# Patient Record
Sex: FEMALE | Race: WHITE | NOT HISPANIC OR LATINO | Employment: OTHER
[De-identification: names, ages, dates, MRNs, and addresses within clinical notes are randomized per-mention and may not be internally consistent; named-entity substitution may affect disease eponyms.]

---

## 2020-08-10 LAB
CYTOLOGY CVX/VAG DOC THIN PREP: NORMAL
HPV16+18+45 E6+E7MRNA CVX NAA+PROBE: NEGATIVE

## 2021-08-09 ENCOUNTER — TELEPHONE (OUTPATIENT)
Dept: SCHEDULING | Age: 46
End: 2021-08-09

## 2021-08-26 ENCOUNTER — OFFICE VISIT (OUTPATIENT)
Dept: FAMILY MEDICINE | Age: 46
End: 2021-08-26

## 2021-08-26 VITALS
BODY MASS INDEX: 27.49 KG/M2 | WEIGHT: 161 LBS | DIASTOLIC BLOOD PRESSURE: 61 MMHG | HEIGHT: 64 IN | RESPIRATION RATE: 16 BRPM | OXYGEN SATURATION: 98 % | TEMPERATURE: 98.9 F | SYSTOLIC BLOOD PRESSURE: 96 MMHG | HEART RATE: 80 BPM

## 2021-08-26 DIAGNOSIS — E55.9 VITAMIN D DEFICIENCY: ICD-10-CM

## 2021-08-26 DIAGNOSIS — E78.5 HYPERLIPIDEMIA, UNSPECIFIED HYPERLIPIDEMIA TYPE: ICD-10-CM

## 2021-08-26 DIAGNOSIS — R30.0 DYSURIA: Primary | ICD-10-CM

## 2021-08-26 DIAGNOSIS — K80.20 CALCULUS OF GALLBLADDER WITHOUT CHOLECYSTITIS WITHOUT OBSTRUCTION: ICD-10-CM

## 2021-08-26 DIAGNOSIS — K21.9 GASTROESOPHAGEAL REFLUX DISEASE WITHOUT ESOPHAGITIS: ICD-10-CM

## 2021-08-26 LAB
APPEARANCE, POC: CLEAR
BILIRUBIN, POC: NEGATIVE
COLOR, POC: YELLOW
GLUCOSE UR-MCNC: NEGATIVE MG/DL
KETONES, POC: ABNORMAL MG/DL
NITRITE, POC: NEGATIVE
OCCULT BLOOD, POC: ABNORMAL
PH UR: 5.5 [PH] (ref 5–7)
PROT UR-MCNC: NEGATIVE MG/DL
SP GR UR: >= 1.03 (ref 1–1.03)
UROBILINOGEN UR-MCNC: 0.2 MG/DL (ref 0–1)
WBC (LEUKOCYTE) ESTERASE, POC: NEGATIVE

## 2021-08-26 PROCEDURE — 99204 OFFICE O/P NEW MOD 45 MIN: CPT | Performed by: FAMILY MEDICINE

## 2021-08-26 PROCEDURE — 81003 URINALYSIS AUTO W/O SCOPE: CPT | Performed by: FAMILY MEDICINE

## 2021-08-26 PROCEDURE — 87086 URINE CULTURE/COLONY COUNT: CPT | Performed by: FAMILY MEDICINE

## 2021-08-26 RX ORDER — NITROFURANTOIN 25; 75 MG/1; MG/1
100 CAPSULE ORAL 2 TIMES DAILY
COMMUNITY
Start: 2021-08-23 | End: 2021-09-27 | Stop reason: ALTCHOICE

## 2021-08-26 RX ORDER — CEPHALEXIN 250 MG/1
250 CAPSULE ORAL 3 TIMES DAILY
Qty: 15 CAPSULE | Refills: 0 | Status: SHIPPED | OUTPATIENT
Start: 2021-08-26 | End: 2021-09-27 | Stop reason: ALTCHOICE

## 2021-08-26 ASSESSMENT — ENCOUNTER SYMPTOMS
RESPIRATORY NEGATIVE: 1
ENDOCRINE NEGATIVE: 1
CONSTITUTIONAL NEGATIVE: 1
PSYCHIATRIC NEGATIVE: 1
ALLERGIC/IMMUNOLOGIC NEGATIVE: 1
HEMATOLOGIC/LYMPHATIC NEGATIVE: 1
ABDOMINAL DISTENTION: 1
NEUROLOGICAL NEGATIVE: 1
EYES NEGATIVE: 1

## 2021-08-26 ASSESSMENT — PATIENT HEALTH QUESTIONNAIRE - PHQ9
1. LITTLE INTEREST OR PLEASURE IN DOING THINGS: NOT AT ALL
2. FEELING DOWN, DEPRESSED OR HOPELESS: NOT AT ALL
CLINICAL INTERPRETATION OF PHQ9 SCORE: NO FURTHER SCREENING NEEDED
SUM OF ALL RESPONSES TO PHQ9 QUESTIONS 1 AND 2: 0
CLINICAL INTERPRETATION OF PHQ2 SCORE: NO FURTHER SCREENING NEEDED
SUM OF ALL RESPONSES TO PHQ9 QUESTIONS 1 AND 2: 0

## 2021-08-28 LAB — BACTERIA UR CULT: NORMAL

## 2021-08-30 ENCOUNTER — TELEPHONE (OUTPATIENT)
Dept: FAMILY MEDICINE | Age: 46
End: 2021-08-30

## 2021-09-03 ENCOUNTER — LAB SERVICES (OUTPATIENT)
Dept: LAB | Age: 46
End: 2021-09-03

## 2021-09-03 DIAGNOSIS — K21.9 GASTROESOPHAGEAL REFLUX DISEASE WITHOUT ESOPHAGITIS: ICD-10-CM

## 2021-09-03 DIAGNOSIS — E78.5 HYPERLIPIDEMIA, UNSPECIFIED HYPERLIPIDEMIA TYPE: ICD-10-CM

## 2021-09-03 DIAGNOSIS — E55.9 VITAMIN D DEFICIENCY: ICD-10-CM

## 2021-09-03 DIAGNOSIS — R30.0 DYSURIA: ICD-10-CM

## 2021-09-03 LAB
25(OH)D3+25(OH)D2 SERPL-MCNC: 28.1 NG/ML (ref 30–100)
ALBUMIN SERPL-MCNC: 3.7 G/DL (ref 3.6–5.1)
ALBUMIN/GLOB SERPL: 1 {RATIO} (ref 1–2.4)
ALP SERPL-CCNC: 62 UNITS/L (ref 45–117)
ALT SERPL-CCNC: 24 UNITS/L
ANION GAP SERPL CALC-SCNC: 12 MMOL/L (ref 10–20)
AST SERPL-CCNC: 14 UNITS/L
BASOPHILS # BLD: 0 K/MCL (ref 0–0.3)
BASOPHILS NFR BLD: 1 %
BILIRUB SERPL-MCNC: 0.4 MG/DL (ref 0.2–1)
BUN SERPL-MCNC: 10 MG/DL (ref 6–20)
BUN/CREAT SERPL: 13 (ref 7–25)
CALCIUM SERPL-MCNC: 8.7 MG/DL (ref 8.4–10.2)
CHLORIDE SERPL-SCNC: 107 MMOL/L (ref 98–107)
CO2 SERPL-SCNC: 26 MMOL/L (ref 21–32)
CREAT SERPL-MCNC: 0.79 MG/DL (ref 0.51–0.95)
DEPRECATED RDW RBC: 40.5 FL (ref 39–50)
EOSINOPHIL # BLD: 0.2 K/MCL (ref 0–0.5)
EOSINOPHIL NFR BLD: 4 %
ERYTHROCYTE [DISTWIDTH] IN BLOOD: 12.4 % (ref 11–15)
FASTING DURATION TIME PATIENT: 14 HOURS (ref 0–999)
GFR SERPLBLD BASED ON 1.73 SQ M-ARVRAT: >90 ML/MIN
GLOBULIN SER-MCNC: 3.7 G/DL (ref 2–4)
GLUCOSE SERPL-MCNC: 82 MG/DL (ref 65–99)
HCT VFR BLD CALC: 38.7 % (ref 36–46.5)
HGB BLD-MCNC: 12.8 G/DL (ref 12–15.5)
IMM GRANULOCYTES # BLD AUTO: 0 K/MCL (ref 0–0.2)
IMM GRANULOCYTES # BLD: 0 %
LYMPHOCYTES # BLD: 2.2 K/MCL (ref 1–4.8)
LYMPHOCYTES NFR BLD: 43 %
MCH RBC QN AUTO: 29.3 PG (ref 26–34)
MCHC RBC AUTO-ENTMCNC: 33.1 G/DL (ref 32–36.5)
MCV RBC AUTO: 88.6 FL (ref 78–100)
MONOCYTES # BLD: 0.3 K/MCL (ref 0.3–0.9)
MONOCYTES NFR BLD: 7 %
NEUTROPHILS # BLD: 2.4 K/MCL (ref 1.8–7.7)
NEUTROPHILS NFR BLD: 45 %
NRBC BLD MANUAL-RTO: 0 /100 WBC
PLATELET # BLD AUTO: 221 K/MCL (ref 140–450)
POTASSIUM SERPL-SCNC: 3.9 MMOL/L (ref 3.4–5.1)
PROT SERPL-MCNC: 7.4 G/DL (ref 6.4–8.2)
RBC # BLD: 4.37 MIL/MCL (ref 4–5.2)
SODIUM SERPL-SCNC: 141 MMOL/L (ref 135–145)
TSH SERPL-ACNC: 2.02 MCUNITS/ML (ref 0.35–5)
WBC # BLD: 5.2 K/MCL (ref 4.2–11)

## 2021-09-03 PROCEDURE — 82306 VITAMIN D 25 HYDROXY: CPT | Performed by: FAMILY MEDICINE

## 2021-09-03 PROCEDURE — 85025 COMPLETE CBC W/AUTO DIFF WBC: CPT | Performed by: FAMILY MEDICINE

## 2021-09-03 PROCEDURE — 84443 ASSAY THYROID STIM HORMONE: CPT | Performed by: FAMILY MEDICINE

## 2021-09-03 PROCEDURE — 80053 COMPREHEN METABOLIC PANEL: CPT | Performed by: FAMILY MEDICINE

## 2021-09-03 PROCEDURE — 36415 COLL VENOUS BLD VENIPUNCTURE: CPT | Performed by: FAMILY MEDICINE

## 2021-09-27 ENCOUNTER — TELEPHONE (OUTPATIENT)
Dept: SCHEDULING | Age: 46
End: 2021-09-27

## 2021-09-27 ENCOUNTER — OFFICE VISIT (OUTPATIENT)
Dept: FAMILY MEDICINE | Age: 46
End: 2021-09-27

## 2021-09-27 VITALS
HEART RATE: 78 BPM | WEIGHT: 161 LBS | SYSTOLIC BLOOD PRESSURE: 110 MMHG | BODY MASS INDEX: 27.49 KG/M2 | TEMPERATURE: 98.6 F | DIASTOLIC BLOOD PRESSURE: 70 MMHG | HEIGHT: 64 IN | OXYGEN SATURATION: 99 % | RESPIRATION RATE: 16 BRPM

## 2021-09-27 DIAGNOSIS — N89.8 VAGINAL DISCHARGE: ICD-10-CM

## 2021-09-27 DIAGNOSIS — R30.0 DYSURIA: Primary | ICD-10-CM

## 2021-09-27 LAB
APPEARANCE, POC: CLEAR
BILIRUBIN, POC: NEGATIVE
COLOR, POC: YELLOW
GLUCOSE UR-MCNC: NEGATIVE MG/DL
KETONES, POC: NEGATIVE MG/DL
NITRITE, POC: NEGATIVE
OCCULT BLOOD, POC: NEGATIVE
PH UR: 7 [PH] (ref 5–7)
PROT UR-MCNC: NEGATIVE MG/DL
SP GR UR: 1.02 (ref 1–1.03)
UROBILINOGEN UR-MCNC: 0.2 MG/DL (ref 0–1)
WBC (LEUKOCYTE) ESTERASE, POC: NEGATIVE

## 2021-09-27 PROCEDURE — 87086 URINE CULTURE/COLONY COUNT: CPT | Performed by: NURSE PRACTITIONER

## 2021-09-27 PROCEDURE — 99213 OFFICE O/P EST LOW 20 MIN: CPT | Performed by: NURSE PRACTITIONER

## 2021-09-27 PROCEDURE — 87070 CULTURE OTHR SPECIMN AEROBIC: CPT | Performed by: NURSE PRACTITIONER

## 2021-09-27 PROCEDURE — 81003 URINALYSIS AUTO W/O SCOPE: CPT | Performed by: NURSE PRACTITIONER

## 2021-09-27 PROCEDURE — 87205 SMEAR GRAM STAIN: CPT | Performed by: NURSE PRACTITIONER

## 2021-09-27 RX ORDER — CEFUROXIME AXETIL 250 MG/1
TABLET ORAL
Qty: 14 TABLET | Refills: 0 | Status: SHIPPED | OUTPATIENT
Start: 2021-09-27

## 2021-09-27 ASSESSMENT — PATIENT HEALTH QUESTIONNAIRE - PHQ9
SUM OF ALL RESPONSES TO PHQ9 QUESTIONS 1 AND 2: 0
SUM OF ALL RESPONSES TO PHQ9 QUESTIONS 1 AND 2: 0
CLINICAL INTERPRETATION OF PHQ9 SCORE: NO FURTHER SCREENING NEEDED
2. FEELING DOWN, DEPRESSED OR HOPELESS: NOT AT ALL
1. LITTLE INTEREST OR PLEASURE IN DOING THINGS: NOT AT ALL
CLINICAL INTERPRETATION OF PHQ2 SCORE: NO FURTHER SCREENING NEEDED

## 2021-09-27 ASSESSMENT — ENCOUNTER SYMPTOMS
PSYCHIATRIC NEGATIVE: 1
FEVER: 0
CHILLS: 0

## 2021-09-28 LAB
BACTERIAL VAGINOSIS VAG-IMP: ABNORMAL
WBC SPEC QL GRAM STN: ABNORMAL
YEAST SPEC QL GRAM STN: ABNORMAL

## 2021-09-29 LAB — BACTERIA UR CULT: NORMAL

## 2021-10-01 LAB — BACTERIA GENITAL AEROBE CULT: NORMAL

## 2021-11-16 ENCOUNTER — LAB REQUISITION (OUTPATIENT)
Dept: LAB | Age: 46
End: 2021-11-16

## 2021-11-16 DIAGNOSIS — Z12.4 ENCOUNTER FOR SCREENING FOR MALIGNANT NEOPLASM OF CERVIX: ICD-10-CM

## 2021-11-16 DIAGNOSIS — R10.2 PELVIC PAIN: Primary | ICD-10-CM

## 2021-11-16 PROCEDURE — 88175 CYTOPATH C/V AUTO FLUID REDO: CPT | Performed by: CLINICAL MEDICAL LABORATORY

## 2021-11-16 PROCEDURE — 87624 HPV HI-RISK TYP POOLED RSLT: CPT | Performed by: CLINICAL MEDICAL LABORATORY

## 2021-11-22 LAB
CASE RPRT: NORMAL
CLINICAL INFO: NORMAL
CYTOLOGY CVX/VAG STUDY: NORMAL
HPV16+18+45 E6+E7MRNA CVX NAA+PROBE: NEGATIVE
Lab: NORMAL
PAP EDUCATIONAL NOTE: NORMAL
SPECIMEN ADEQUACY: NORMAL

## 2022-11-21 ENCOUNTER — TELEPHONE (OUTPATIENT)
Dept: SCHEDULING | Age: 47
End: 2022-11-21

## 2022-11-22 ENCOUNTER — WALK IN (OUTPATIENT)
Dept: URGENT CARE | Age: 47
End: 2022-11-22
Attending: EMERGENCY MEDICINE

## 2022-11-22 ENCOUNTER — HOSPITAL ENCOUNTER (OUTPATIENT)
Dept: GENERAL RADIOLOGY | Age: 47
Discharge: HOME | End: 2022-11-22
Attending: EMERGENCY MEDICINE

## 2022-11-22 VITALS
WEIGHT: 165 LBS | TEMPERATURE: 98.4 F | RESPIRATION RATE: 16 BRPM | SYSTOLIC BLOOD PRESSURE: 122 MMHG | BODY MASS INDEX: 28.32 KG/M2 | DIASTOLIC BLOOD PRESSURE: 76 MMHG | OXYGEN SATURATION: 98 % | HEART RATE: 82 BPM

## 2022-11-22 DIAGNOSIS — M79.604 PAIN OF RIGHT LOWER EXTREMITY: ICD-10-CM

## 2022-11-22 DIAGNOSIS — M25.551 RIGHT HIP PAIN: ICD-10-CM

## 2022-11-22 DIAGNOSIS — M25.551 RIGHT HIP PAIN: Primary | ICD-10-CM

## 2022-11-22 PROCEDURE — 73502 X-RAY EXAM HIP UNI 2-3 VIEWS: CPT

## 2022-11-22 PROCEDURE — 99202 OFFICE O/P NEW SF 15 MIN: CPT

## 2022-11-22 PROCEDURE — 73590 X-RAY EXAM OF LOWER LEG: CPT

## 2022-11-22 RX ORDER — KETOROLAC TROMETHAMINE 10 MG/1
10 TABLET, FILM COATED ORAL EVERY 6 HOURS PRN
Qty: 15 TABLET | Refills: 0 | Status: SHIPPED | OUTPATIENT
Start: 2022-11-22 | End: 2022-11-27

## 2022-11-22 ASSESSMENT — ENCOUNTER SYMPTOMS
ABDOMINAL PAIN: 0
BRUISES/BLEEDS EASILY: 0
VOMITING: 0
DIZZINESS: 0
SORE THROAT: 0
HEADACHES: 0
CHILLS: 0
SHORTNESS OF BREATH: 0
FEVER: 0
COUGH: 0
BACK PAIN: 0
DIARRHEA: 0

## 2022-11-22 ASSESSMENT — PAIN SCALES - GENERAL: PAINLEVEL: 3

## 2023-06-06 PROBLEM — R10.11 RUQ PAIN: Status: ACTIVE | Noted: 2023-06-06

## 2023-06-06 PROBLEM — K80.20 CALCULUS OF GALLBLADDER WITHOUT CHOLECYSTITIS WITHOUT OBSTRUCTION: Status: ACTIVE | Noted: 2023-06-06

## 2023-06-06 PROBLEM — R10.13 EPIGASTRIC PAIN: Status: ACTIVE | Noted: 2023-06-06

## 2023-06-06 PROBLEM — R68.81 EARLY SATIETY: Status: ACTIVE | Noted: 2023-06-06

## 2023-08-19 ENCOUNTER — ANESTHESIA EVENT (OUTPATIENT)
Dept: SURGERY | Facility: HOSPITAL | Age: 48
End: 2023-08-19
Payer: COMMERCIAL

## 2023-08-19 ENCOUNTER — HOSPITAL ENCOUNTER (OUTPATIENT)
Facility: HOSPITAL | Age: 48
Setting detail: OBSERVATION
Discharge: HOME | End: 2023-08-19
Attending: EMERGENCY MEDICINE | Admitting: SURGERY
Payer: COMMERCIAL

## 2023-08-19 ENCOUNTER — APPOINTMENT (OUTPATIENT)
Dept: GENERAL RADIOLOGY | Facility: HOSPITAL | Age: 48
End: 2023-08-19
Attending: EMERGENCY MEDICINE
Payer: COMMERCIAL

## 2023-08-19 ENCOUNTER — APPOINTMENT (OUTPATIENT)
Dept: GENERAL RADIOLOGY | Facility: HOSPITAL | Age: 48
End: 2023-08-19
Attending: SURGERY
Payer: COMMERCIAL

## 2023-08-19 ENCOUNTER — ANESTHESIA (OUTPATIENT)
Dept: SURGERY | Facility: HOSPITAL | Age: 48
End: 2023-08-19
Payer: COMMERCIAL

## 2023-08-19 ENCOUNTER — APPOINTMENT (OUTPATIENT)
Dept: CT IMAGING | Facility: HOSPITAL | Age: 48
End: 2023-08-19
Attending: EMERGENCY MEDICINE
Payer: COMMERCIAL

## 2023-08-19 VITALS
DIASTOLIC BLOOD PRESSURE: 68 MMHG | TEMPERATURE: 98 F | RESPIRATION RATE: 16 BRPM | SYSTOLIC BLOOD PRESSURE: 120 MMHG | BODY MASS INDEX: 29.02 KG/M2 | HEART RATE: 77 BPM | WEIGHT: 170 LBS | OXYGEN SATURATION: 95 % | HEIGHT: 64 IN

## 2023-08-19 DIAGNOSIS — K81.0 ACUTE CHOLECYSTITIS: Primary | ICD-10-CM

## 2023-08-19 PROBLEM — K80.00 CHOLELITHIASIS AND ACUTE CHOLECYSTITIS WITHOUT OBSTRUCTION: Status: ACTIVE | Noted: 2023-08-19

## 2023-08-19 LAB
ALBUMIN SERPL-MCNC: 3.8 G/DL (ref 3.4–5)
ALBUMIN/GLOB SERPL: 1 {RATIO} (ref 1–2)
ALP LIVER SERPL-CCNC: 68 U/L
ALT SERPL-CCNC: 22 U/L
ANION GAP SERPL CALC-SCNC: 5 MMOL/L (ref 0–18)
AST SERPL-CCNC: 12 U/L (ref 15–37)
ATRIAL RATE: 68 BPM
B-HCG UR QL: NEGATIVE
BASOPHILS # BLD AUTO: 0.02 X10(3) UL (ref 0–0.2)
BASOPHILS NFR BLD AUTO: 0.3 %
BILIRUB SERPL-MCNC: 0.4 MG/DL (ref 0.1–2)
BILIRUB UR QL STRIP.AUTO: NEGATIVE
BUN BLD-MCNC: 8 MG/DL (ref 7–18)
CALCIUM BLD-MCNC: 8.8 MG/DL (ref 8.5–10.1)
CHLORIDE SERPL-SCNC: 104 MMOL/L (ref 98–112)
CLARITY UR REFRACT.AUTO: CLEAR
CO2 SERPL-SCNC: 27 MMOL/L (ref 21–32)
CREAT BLD-MCNC: 0.79 MG/DL
EGFRCR SERPLBLD CKD-EPI 2021: 93 ML/MIN/1.73M2 (ref 60–?)
EOSINOPHIL # BLD AUTO: 0.04 X10(3) UL (ref 0–0.7)
EOSINOPHIL NFR BLD AUTO: 0.5 %
ERYTHROCYTE [DISTWIDTH] IN BLOOD BY AUTOMATED COUNT: 12.6 %
GLOBULIN PLAS-MCNC: 3.9 G/DL (ref 2.8–4.4)
GLUCOSE BLD-MCNC: 104 MG/DL (ref 70–99)
GLUCOSE UR STRIP.AUTO-MCNC: NORMAL MG/DL
HCT VFR BLD AUTO: 37.7 %
HGB BLD-MCNC: 13.1 G/DL
IMM GRANULOCYTES # BLD AUTO: 0.01 X10(3) UL (ref 0–1)
IMM GRANULOCYTES NFR BLD: 0.1 %
KETONES UR STRIP.AUTO-MCNC: 10 MG/DL
LEUKOCYTE ESTERASE UR QL STRIP.AUTO: 250
LIPASE SERPL-CCNC: 31 U/L (ref 13–75)
LYMPHOCYTES # BLD AUTO: 2.31 X10(3) UL (ref 1–4)
LYMPHOCYTES NFR BLD AUTO: 29.7 %
MCH RBC QN AUTO: 28.9 PG (ref 26–34)
MCHC RBC AUTO-ENTMCNC: 34.7 G/DL (ref 31–37)
MCV RBC AUTO: 83 FL
MONOCYTES # BLD AUTO: 0.44 X10(3) UL (ref 0.1–1)
MONOCYTES NFR BLD AUTO: 5.7 %
NEUTROPHILS # BLD AUTO: 4.96 X10 (3) UL (ref 1.5–7.7)
NEUTROPHILS # BLD AUTO: 4.96 X10(3) UL (ref 1.5–7.7)
NEUTROPHILS NFR BLD AUTO: 63.7 %
NITRITE UR QL STRIP.AUTO: NEGATIVE
OSMOLALITY SERPL CALC.SUM OF ELEC: 281 MOSM/KG (ref 275–295)
P AXIS: 19 DEGREES
P-R INTERVAL: 180 MS
PH UR STRIP.AUTO: 5.5 [PH] (ref 5–8)
PLATELET # BLD AUTO: 259 10(3)UL (ref 150–450)
POTASSIUM SERPL-SCNC: 3.4 MMOL/L (ref 3.5–5.1)
PROT SERPL-MCNC: 7.7 G/DL (ref 6.4–8.2)
PROT UR STRIP.AUTO-MCNC: NEGATIVE MG/DL
Q-T INTERVAL: 412 MS
QRS DURATION: 72 MS
QTC CALCULATION (BEZET): 438 MS
R AXIS: 42 DEGREES
RBC # BLD AUTO: 4.54 X10(6)UL
SODIUM SERPL-SCNC: 136 MMOL/L (ref 136–145)
SP GR UR STRIP.AUTO: 1.01 (ref 1–1.03)
T AXIS: 33 DEGREES
UROBILINOGEN UR STRIP.AUTO-MCNC: NORMAL MG/DL
VENTRICULAR RATE: 68 BPM
WBC # BLD AUTO: 7.8 X10(3) UL (ref 4–11)

## 2023-08-19 PROCEDURE — 74177 CT ABD & PELVIS W/CONTRAST: CPT | Performed by: EMERGENCY MEDICINE

## 2023-08-19 PROCEDURE — 3074F SYST BP LT 130 MM HG: CPT | Performed by: SURGERY

## 2023-08-19 PROCEDURE — 74300 X-RAY BILE DUCTS/PANCREAS: CPT | Performed by: SURGERY

## 2023-08-19 PROCEDURE — BF502Z0 OTHER IMAGING OF BILE DUCTS USING FLUORESCING AGENT, INTRAOPERATIVE: ICD-10-PCS | Performed by: SURGERY

## 2023-08-19 PROCEDURE — 0FT44ZZ RESECTION OF GALLBLADDER, PERCUTANEOUS ENDOSCOPIC APPROACH: ICD-10-PCS | Performed by: SURGERY

## 2023-08-19 PROCEDURE — 99202 OFFICE O/P NEW SF 15 MIN: CPT | Performed by: SURGERY

## 2023-08-19 PROCEDURE — 71045 X-RAY EXAM CHEST 1 VIEW: CPT | Performed by: EMERGENCY MEDICINE

## 2023-08-19 PROCEDURE — 3078F DIAST BP <80 MM HG: CPT | Performed by: SURGERY

## 2023-08-19 PROCEDURE — 3008F BODY MASS INDEX DOCD: CPT | Performed by: SURGERY

## 2023-08-19 RX ORDER — OXYCODONE HYDROCHLORIDE 10 MG/1
10 TABLET ORAL EVERY 4 HOURS PRN
Status: DISCONTINUED | OUTPATIENT
Start: 2023-08-19 | End: 2023-08-20

## 2023-08-19 RX ORDER — CEFOXITIN 2 G/1
INJECTION, POWDER, FOR SOLUTION INTRAVENOUS AS NEEDED
Status: DISCONTINUED | OUTPATIENT
Start: 2023-08-19 | End: 2023-08-19 | Stop reason: SURG

## 2023-08-19 RX ORDER — ACETAMINOPHEN 500 MG
1000 TABLET ORAL ONCE AS NEEDED
Status: DISCONTINUED | OUTPATIENT
Start: 2023-08-19 | End: 2023-08-19 | Stop reason: HOSPADM

## 2023-08-19 RX ORDER — ROCURONIUM BROMIDE 10 MG/ML
INJECTION, SOLUTION INTRAVENOUS AS NEEDED
Status: DISCONTINUED | OUTPATIENT
Start: 2023-08-19 | End: 2023-08-19 | Stop reason: SURG

## 2023-08-19 RX ORDER — HYDROMORPHONE HYDROCHLORIDE 1 MG/ML
0.4 INJECTION, SOLUTION INTRAMUSCULAR; INTRAVENOUS; SUBCUTANEOUS EVERY 2 HOUR PRN
Status: DISCONTINUED | OUTPATIENT
Start: 2023-08-19 | End: 2023-08-20

## 2023-08-19 RX ORDER — MEPERIDINE HYDROCHLORIDE 25 MG/ML
INJECTION INTRAMUSCULAR; INTRAVENOUS; SUBCUTANEOUS
Status: COMPLETED
Start: 2023-08-19 | End: 2023-08-19

## 2023-08-19 RX ORDER — ACETAMINOPHEN 500 MG
1000 TABLET ORAL EVERY 8 HOURS SCHEDULED
Status: DISCONTINUED | OUTPATIENT
Start: 2023-08-19 | End: 2023-08-20

## 2023-08-19 RX ORDER — HYDROMORPHONE HYDROCHLORIDE 1 MG/ML
0.2 INJECTION, SOLUTION INTRAMUSCULAR; INTRAVENOUS; SUBCUTANEOUS EVERY 5 MIN PRN
Status: DISCONTINUED | OUTPATIENT
Start: 2023-08-19 | End: 2023-08-19 | Stop reason: HOSPADM

## 2023-08-19 RX ORDER — HYDROCODONE BITARTRATE AND ACETAMINOPHEN 5; 325 MG/1; MG/1
2 TABLET ORAL ONCE AS NEEDED
Status: DISCONTINUED | OUTPATIENT
Start: 2023-08-19 | End: 2023-08-19 | Stop reason: HOSPADM

## 2023-08-19 RX ORDER — HYDROMORPHONE HYDROCHLORIDE 1 MG/ML
0.8 INJECTION, SOLUTION INTRAMUSCULAR; INTRAVENOUS; SUBCUTANEOUS EVERY 2 HOUR PRN
Status: DISCONTINUED | OUTPATIENT
Start: 2023-08-19 | End: 2023-08-20

## 2023-08-19 RX ORDER — MIDAZOLAM HYDROCHLORIDE 1 MG/ML
1 INJECTION INTRAMUSCULAR; INTRAVENOUS EVERY 5 MIN PRN
Status: DISCONTINUED | OUTPATIENT
Start: 2023-08-19 | End: 2023-08-19 | Stop reason: HOSPADM

## 2023-08-19 RX ORDER — LIDOCAINE HYDROCHLORIDE 10 MG/ML
INJECTION, SOLUTION EPIDURAL; INFILTRATION; INTRACAUDAL; PERINEURAL AS NEEDED
Status: DISCONTINUED | OUTPATIENT
Start: 2023-08-19 | End: 2023-08-19 | Stop reason: SURG

## 2023-08-19 RX ORDER — DIPHENHYDRAMINE HYDROCHLORIDE 50 MG/ML
12.5 INJECTION INTRAMUSCULAR; INTRAVENOUS AS NEEDED
Status: DISCONTINUED | OUTPATIENT
Start: 2023-08-19 | End: 2023-08-19 | Stop reason: HOSPADM

## 2023-08-19 RX ORDER — BUPIVACAINE HYDROCHLORIDE AND EPINEPHRINE 5; 5 MG/ML; UG/ML
INJECTION, SOLUTION EPIDURAL; INTRACAUDAL; PERINEURAL AS NEEDED
Status: DISCONTINUED | OUTPATIENT
Start: 2023-08-19 | End: 2023-08-19 | Stop reason: HOSPADM

## 2023-08-19 RX ORDER — ENEMA 19; 7 G/133ML; G/133ML
1 ENEMA RECTAL ONCE AS NEEDED
Status: DISCONTINUED | OUTPATIENT
Start: 2023-08-19 | End: 2023-08-20

## 2023-08-19 RX ORDER — DEXAMETHASONE SODIUM PHOSPHATE 4 MG/ML
VIAL (ML) INJECTION AS NEEDED
Status: DISCONTINUED | OUTPATIENT
Start: 2023-08-19 | End: 2023-08-19 | Stop reason: SURG

## 2023-08-19 RX ORDER — MEPERIDINE HYDROCHLORIDE 25 MG/ML
12.5 INJECTION INTRAMUSCULAR; INTRAVENOUS; SUBCUTANEOUS AS NEEDED
Status: COMPLETED | OUTPATIENT
Start: 2023-08-19 | End: 2023-08-19

## 2023-08-19 RX ORDER — PROCHLORPERAZINE EDISYLATE 5 MG/ML
5 INJECTION INTRAMUSCULAR; INTRAVENOUS EVERY 8 HOURS PRN
Status: DISCONTINUED | OUTPATIENT
Start: 2023-08-19 | End: 2023-08-20

## 2023-08-19 RX ORDER — FAMOTIDINE 20 MG/1
20 TABLET, FILM COATED ORAL DAILY
Status: DISCONTINUED | OUTPATIENT
Start: 2023-08-19 | End: 2023-08-20

## 2023-08-19 RX ORDER — HYDROMORPHONE HYDROCHLORIDE 1 MG/ML
0.4 INJECTION, SOLUTION INTRAMUSCULAR; INTRAVENOUS; SUBCUTANEOUS EVERY 5 MIN PRN
Status: DISCONTINUED | OUTPATIENT
Start: 2023-08-19 | End: 2023-08-19 | Stop reason: HOSPADM

## 2023-08-19 RX ORDER — SODIUM CHLORIDE, SODIUM LACTATE, POTASSIUM CHLORIDE, CALCIUM CHLORIDE 600; 310; 30; 20 MG/100ML; MG/100ML; MG/100ML; MG/100ML
INJECTION, SOLUTION INTRAVENOUS CONTINUOUS
Status: DISCONTINUED | OUTPATIENT
Start: 2023-08-19 | End: 2023-08-19 | Stop reason: HOSPADM

## 2023-08-19 RX ORDER — HYDROCODONE BITARTRATE AND ACETAMINOPHEN 5; 325 MG/1; MG/1
1 TABLET ORAL EVERY 6 HOURS PRN
Qty: 20 TABLET | Refills: 0 | Status: SHIPPED | OUTPATIENT
Start: 2023-08-19

## 2023-08-19 RX ORDER — ONDANSETRON 2 MG/ML
4 INJECTION INTRAMUSCULAR; INTRAVENOUS EVERY 6 HOURS PRN
Status: DISCONTINUED | OUTPATIENT
Start: 2023-08-19 | End: 2023-08-19 | Stop reason: HOSPADM

## 2023-08-19 RX ORDER — KETOROLAC TROMETHAMINE 30 MG/ML
INJECTION, SOLUTION INTRAMUSCULAR; INTRAVENOUS AS NEEDED
Status: DISCONTINUED | OUTPATIENT
Start: 2023-08-19 | End: 2023-08-19 | Stop reason: SURG

## 2023-08-19 RX ORDER — ONDANSETRON 2 MG/ML
4 INJECTION INTRAMUSCULAR; INTRAVENOUS EVERY 4 HOURS PRN
Status: DISCONTINUED | OUTPATIENT
Start: 2023-08-19 | End: 2023-08-19

## 2023-08-19 RX ORDER — HYDROCODONE BITARTRATE AND ACETAMINOPHEN 5; 325 MG/1; MG/1
1 TABLET ORAL ONCE AS NEEDED
Status: DISCONTINUED | OUTPATIENT
Start: 2023-08-19 | End: 2023-08-19 | Stop reason: HOSPADM

## 2023-08-19 RX ORDER — ONDANSETRON 2 MG/ML
4 INJECTION INTRAMUSCULAR; INTRAVENOUS EVERY 6 HOURS PRN
Status: DISCONTINUED | OUTPATIENT
Start: 2023-08-19 | End: 2023-08-20

## 2023-08-19 RX ORDER — BISACODYL 10 MG
10 SUPPOSITORY, RECTAL RECTAL
Status: DISCONTINUED | OUTPATIENT
Start: 2023-08-19 | End: 2023-08-20

## 2023-08-19 RX ORDER — MIDAZOLAM HYDROCHLORIDE 1 MG/ML
INJECTION INTRAMUSCULAR; INTRAVENOUS
Status: COMPLETED
Start: 2023-08-19 | End: 2023-08-19

## 2023-08-19 RX ORDER — HYDROMORPHONE HYDROCHLORIDE 1 MG/ML
0.5 INJECTION, SOLUTION INTRAMUSCULAR; INTRAVENOUS; SUBCUTANEOUS EVERY 30 MIN PRN
Status: DISCONTINUED | OUTPATIENT
Start: 2023-08-19 | End: 2023-08-19

## 2023-08-19 RX ORDER — SENNOSIDES 8.6 MG
17.2 TABLET ORAL NIGHTLY PRN
Status: DISCONTINUED | OUTPATIENT
Start: 2023-08-19 | End: 2023-08-20

## 2023-08-19 RX ORDER — SODIUM CHLORIDE, SODIUM LACTATE, POTASSIUM CHLORIDE, CALCIUM CHLORIDE 600; 310; 30; 20 MG/100ML; MG/100ML; MG/100ML; MG/100ML
INJECTION, SOLUTION INTRAVENOUS CONTINUOUS
Status: DISCONTINUED | OUTPATIENT
Start: 2023-08-19 | End: 2023-08-20

## 2023-08-19 RX ORDER — HYDROMORPHONE HYDROCHLORIDE 1 MG/ML
INJECTION, SOLUTION INTRAMUSCULAR; INTRAVENOUS; SUBCUTANEOUS
Status: COMPLETED
Start: 2023-08-19 | End: 2023-08-19

## 2023-08-19 RX ORDER — DIPHENHYDRAMINE HYDROCHLORIDE 50 MG/ML
INJECTION INTRAMUSCULAR; INTRAVENOUS AS NEEDED
Status: DISCONTINUED | OUTPATIENT
Start: 2023-08-19 | End: 2023-08-19 | Stop reason: SURG

## 2023-08-19 RX ORDER — ENOXAPARIN SODIUM 100 MG/ML
40 INJECTION SUBCUTANEOUS DAILY
Status: DISCONTINUED | OUTPATIENT
Start: 2023-08-20 | End: 2023-08-20

## 2023-08-19 RX ORDER — KETOROLAC TROMETHAMINE 15 MG/ML
15 INJECTION, SOLUTION INTRAMUSCULAR; INTRAVENOUS ONCE
Status: COMPLETED | OUTPATIENT
Start: 2023-08-19 | End: 2023-08-19

## 2023-08-19 RX ORDER — FAMOTIDINE 10 MG/ML
20 INJECTION, SOLUTION INTRAVENOUS DAILY
Status: DISCONTINUED | OUTPATIENT
Start: 2023-08-19 | End: 2023-08-20

## 2023-08-19 RX ORDER — OXYCODONE HYDROCHLORIDE 5 MG/1
5 TABLET ORAL EVERY 4 HOURS PRN
Status: DISCONTINUED | OUTPATIENT
Start: 2023-08-19 | End: 2023-08-20

## 2023-08-19 RX ORDER — PROCHLORPERAZINE EDISYLATE 5 MG/ML
5 INJECTION INTRAMUSCULAR; INTRAVENOUS EVERY 8 HOURS PRN
Status: DISCONTINUED | OUTPATIENT
Start: 2023-08-19 | End: 2023-08-19 | Stop reason: HOSPADM

## 2023-08-19 RX ORDER — HYDROMORPHONE HYDROCHLORIDE 1 MG/ML
0.6 INJECTION, SOLUTION INTRAMUSCULAR; INTRAVENOUS; SUBCUTANEOUS EVERY 5 MIN PRN
Status: DISCONTINUED | OUTPATIENT
Start: 2023-08-19 | End: 2023-08-19 | Stop reason: HOSPADM

## 2023-08-19 RX ORDER — SODIUM CHLORIDE, SODIUM LACTATE, POTASSIUM CHLORIDE, CALCIUM CHLORIDE 600; 310; 30; 20 MG/100ML; MG/100ML; MG/100ML; MG/100ML
INJECTION, SOLUTION INTRAVENOUS CONTINUOUS PRN
Status: DISCONTINUED | OUTPATIENT
Start: 2023-08-19 | End: 2023-08-19 | Stop reason: SURG

## 2023-08-19 RX ORDER — SODIUM CHLORIDE 9 MG/ML
INJECTION, SOLUTION INTRAVENOUS CONTINUOUS
Status: DISCONTINUED | OUTPATIENT
Start: 2023-08-19 | End: 2023-08-19

## 2023-08-19 RX ORDER — POLYETHYLENE GLYCOL 3350 17 G/17G
17 POWDER, FOR SOLUTION ORAL DAILY PRN
Status: DISCONTINUED | OUTPATIENT
Start: 2023-08-19 | End: 2023-08-20

## 2023-08-19 RX ORDER — NALOXONE HYDROCHLORIDE 0.4 MG/ML
80 INJECTION, SOLUTION INTRAMUSCULAR; INTRAVENOUS; SUBCUTANEOUS AS NEEDED
Status: DISCONTINUED | OUTPATIENT
Start: 2023-08-19 | End: 2023-08-19 | Stop reason: HOSPADM

## 2023-08-19 RX ORDER — ONDANSETRON 2 MG/ML
INJECTION INTRAMUSCULAR; INTRAVENOUS AS NEEDED
Status: DISCONTINUED | OUTPATIENT
Start: 2023-08-19 | End: 2023-08-19 | Stop reason: SURG

## 2023-08-19 RX ADMIN — KETOROLAC TROMETHAMINE 30 MG: 30 INJECTION, SOLUTION INTRAMUSCULAR; INTRAVENOUS at 15:59:00

## 2023-08-19 RX ADMIN — SODIUM CHLORIDE, SODIUM LACTATE, POTASSIUM CHLORIDE, CALCIUM CHLORIDE: 600; 310; 30; 20 INJECTION, SOLUTION INTRAVENOUS at 14:54:00

## 2023-08-19 RX ADMIN — CEFOXITIN 2 G: 2 INJECTION, POWDER, FOR SOLUTION INTRAVENOUS at 15:07:00

## 2023-08-19 RX ADMIN — DEXAMETHASONE SODIUM PHOSPHATE 4 MG: 4 MG/ML VIAL (ML) INJECTION at 15:00:00

## 2023-08-19 RX ADMIN — ONDANSETRON 4 MG: 2 INJECTION INTRAMUSCULAR; INTRAVENOUS at 15:59:00

## 2023-08-19 RX ADMIN — DIPHENHYDRAMINE HYDROCHLORIDE 12.5 MG: 50 INJECTION INTRAMUSCULAR; INTRAVENOUS at 15:00:00

## 2023-08-19 RX ADMIN — LIDOCAINE HYDROCHLORIDE 50 MG: 10 INJECTION, SOLUTION EPIDURAL; INFILTRATION; INTRACAUDAL; PERINEURAL at 15:00:00

## 2023-08-19 RX ADMIN — ROCURONIUM BROMIDE 50 MG: 10 INJECTION, SOLUTION INTRAVENOUS at 15:09:00

## 2023-08-19 NOTE — OPERATIVE REPORT
[facility]  Op Note    Arcadio Brar Location: OR   [facility] [id number] MRN [id number]   Admission Date 8/19/2023 Operation Date 8/19/2023   Attending Physician Edgardo Sorto MD Operating Physician Yisel Murrell MD   DATE OF OPERATION:  8/19/2023   PREOPERATIVE DIAGNOSIS: Acute cholecystitis with cholelithiasis  POSTOPERATIVE DIAGNOSIS: Acute cholecystitis with cholelithiasis  PROCEDURE PERFORMED: Laparoscopic cholecystectomy with intraoperative cholangiogram.   SURGEON:  Yisel Murrell MD  ASSISTANT: Orquidea Shukla PA-C (Her assistance was essential to the performance and conduct of this case, especially in the performance of the cholangiogram and the careful dissection needed in and around the triangle of Calot and gallbladder hilum.)  ANESTHESIA: General.   SPECIMEN: Gallbladder to pathology. BLOOD LOSS:  20 cc   COMPLICATIONS: None. INDICATIONS FOR PROCEDURE: The patient is a 61-year-old female who presented to the emergency room with severe epigastric and right upper quadrant abdominal pain. Work-up revealed acute cholecystitis with cholelithiasis. She was offered urgent laparoscopic cholecystectomy with intraoperative cholangiogram.  The risks, benefits, and alternatives were discussed in detail with the patient. Risks include but are not limited to bleeding, wound infection, right shoulder pain, injury to common bile duct, injury to the liver and injury to other intraabdominal contents. The patient was agreeable to proceed with the operation. FINDINGS: The gallbladder was quite distended and inflamed. Cholangiography was normal.  OPERATIVE TECHNIQUE: After informed consent was obtained, the patient was taken to the operating room and placed in the supine position. General anesthesia was induced. The abdomen was then prepped and draped in the usual sterile fashion. The umbilicus was inverted, and a curvilinear incision was made superior to it with an 11-blade scalpel.   The Veress needle was passed into the abdomen, and pneumoperitoneum was instituted without difficulty. The 11 mm trocar was passed through this incision site. The abdomen was inspected and found to be grossly normal. Under direct vision, a 5 mm subxiphoid port and two right-sided lateral 5 mm ports were placed. The patient was placed head-up, right side up. The gallbladder was grasped and retracted cranially. Dissection commenced around the cystic duct and cystic artery, which were isolated. Dissection was kept above the line of Rouviere. The critical view was obtained, demonstrating 2 structures directly entering the gallbladder. One clip was placed towards the gallbladder on the cystic duct, and the duct was partially transected. The cystic duct was cannulated. A cholangiogram was obtained showing good flow in the duodenum with no filling defects and good flow towards the liver. The cholangiocath was withdrawn, and three clips were placed opposite the partial transection. The duct was then completely transected. The cystic artery was isolated, one clip was placed towards the gallbladder, two away, and the artery was transected. The gallbladder was then dissected free from the liver bed using cautery. The gallbladder was then withdrawn through the umbilical port site after being placed in an Endo Catch bag. Inspection of the liver bed revealed some bleeding that was controlled with cautery. The abdomen was copiously irrigated with normal saline. Once hemostasis was assured and the irrigant returned as clear and colorless, the fascia at the umbilical port site was reapproximated with the Endo Close system and Maxon suture. The pneumoperitoneum was aspirated, and the remaining ports were withdrawn. The skin at all four incisions was reapproximated with subcuticular Vicryl suture. 0.5% Marcaine with epinephrine was injected around the incisions to help with postoperative pain control.  Steri-Strips and Mastisol were placed across the incisions. The patient tolerated the procedure well, was extubated in the OR, and went to the PACU in good condition.     Meche Martinez MD

## 2023-08-19 NOTE — PLAN OF CARE
Patient admitted via stretcher from ED. Oriented to room. Safety precautions initiated. Call light in reach. Spouse Brady at bedside. Reports 1/10 right side abdominal pain. Patient and spouse updated on plan for surgery this afternoon and NPO status.

## 2023-08-19 NOTE — ED INITIAL ASSESSMENT (HPI)
Patient here with c/o RUQ abdominal pain x 4 days. Pain radiates to the back. Patient also nauseated and feels bloated.

## 2023-08-19 NOTE — ED QUICK NOTES
Pt awake and alert, skin w/d,resps reg/unlabored. Pt appears comfortable. Family at bedside. Pt reports pain at 2/10. Pt aware awaiting bed assignment. Pt given additional warm blanket for comfort. Fluids running per order.

## 2023-08-19 NOTE — ANESTHESIA POSTPROCEDURE EVALUATION
[facility] Patient Status:  Observation   Age/Gender 57year old female MRN [id number]   Location [address] [facility] Attending Raven Calvillo MD   [facility] Day # 0 PCP None Pcp       Anesthesia Post-op Note    LAPAROSCOPIC CHOLECYSTECTOMY WITH CHOLANGIOGRAM    Procedure Summary       Date: 08/19/23 Room / Location: [facility] MAIN OR 08 / [address] [facility]    Anesthesia Start: 5338 Anesthesia Stop:     Procedure: LAPAROSCOPIC CHOLECYSTECTOMY WITH CHOLANGIOGRAM (Abdomen) Diagnosis:       Acute cholecystitis      (Acute cholecystitis [K81.0])    Surgeons: Raven Calvillo MD Anesthesiologist: Hang Mccormick MD    Anesthesia Type: general ASA Status: 1            Anesthesia Type: general    Vitals Value Taken Time   /81 08/19/23 1622   Temp 97.8 08/19/23 1622   Pulse 74 08/19/23 1622   Resp 16 08/19/23 1622   SpO2 97 08/19/23 1622       Patient Location: PACU    Anesthesia Type: general    Airway Patency: extubated    Postop Pain Control: adequate    Mental Status: mildly sedated but able to meaningfully participate in the post-anesthesia evaluation    Nausea/Vomiting: none    Cardiopulmonary/Hydration status: stable euvolemic    Complications: no apparent anesthesia related complications    Postop vital signs: stable    Dental Exam: Unchanged from Preop    Patient to be discharged from PACU when criteria met.

## 2023-08-19 NOTE — ANESTHESIA PROCEDURE NOTES
Airway  Date/Time: 8/19/2023 3:04 PM  Urgency: elective      General Information and Staff    Patient location during procedure: OR  Anesthesiologist: Halie Forde MD  Performed: anesthesiologist   Performed by: Halie Forde MD  Authorized by: Halie Forde MD      Indications and Patient Condition  Indications for airway management: anesthesia  Sedation level: deep  Preoxygenated: yes  Patient position: sniffing  Mask difficulty assessment: 1 - vent by mask    Final Airway Details  Final airway type: endotracheal airway      Successful airway: ETT  Cuffed: yes   Successful intubation technique: direct laryngoscopy  Endotracheal tube insertion site: oral  Blade: Kami  Blade size: #3  ETT size (mm): 7.0    Cormack-Lehane Classification: grade I - full view of glottis  Placement verified by: capnometry   Measured from: lips  ETT to lips (cm): 22  Number of attempts at approach: 1

## 2023-08-19 NOTE — PROGRESS NOTES
Pt sent to OR, accompanied by her spouse, Estrellita Lisa. Jewelry removed and left with spouse. Surgical consent signed and on chart.

## 2023-08-19 NOTE — ED QUICK NOTES
Orders for admission, patient is aware of plan and ready to go upstairs. Any questions, please call ED RN Livier at extension [phone]. Patient Covid vaccination status: Fully vaccinated     COVID Test Ordered in ED: None    COVID Suspicion at Admission: N/A    Running Infusions:  NS liter bolus    Mental Status/LOC at time of transport: alert and oriented x 3    Other pertinent information: pt with abdominal pain, + juan, given dilaudid with some relief of pain.    CIWA score: N/A   NIH score:  N/A

## 2023-08-20 NOTE — PROGRESS NOTES
NURSING DISCHARGE NOTE    Discharged Home via  private car . Accompanied by Spouse  Belongings Taken by patient/family. Patient stable, provided handout education, paper prescription, and supplies for patient. Answered questions by patient and patient and spouse verbalized understanding. Discharged w/ spouse and accompanied by PCT.

## 2023-08-29 ENCOUNTER — OFFICE VISIT (OUTPATIENT)
Facility: LOCATION | Age: 48
End: 2023-08-29
Payer: COMMERCIAL

## 2023-08-29 VITALS — TEMPERATURE: 97 F | HEART RATE: 101 BPM

## 2023-08-29 DIAGNOSIS — M79.662 PAIN OF LEFT CALF: Primary | ICD-10-CM

## 2023-08-29 DIAGNOSIS — K80.00 CHOLELITHIASIS AND ACUTE CHOLECYSTITIS WITHOUT OBSTRUCTION: ICD-10-CM

## 2023-08-29 DIAGNOSIS — Z98.890 POST-OPERATIVE STATE: ICD-10-CM

## 2023-08-29 PROCEDURE — 99024 POSTOP FOLLOW-UP VISIT: CPT | Performed by: PHYSICIAN ASSISTANT

## 2023-08-30 ENCOUNTER — TELEPHONE (OUTPATIENT)
Facility: LOCATION | Age: 48
End: 2023-08-30

## 2023-08-30 ENCOUNTER — HOSPITAL ENCOUNTER (OUTPATIENT)
Dept: ULTRASOUND IMAGING | Facility: HOSPITAL | Age: 48
Discharge: HOME | End: 2023-08-30
Attending: PHYSICIAN ASSISTANT
Payer: COMMERCIAL

## 2023-08-30 DIAGNOSIS — M79.662 PAIN OF LEFT CALF: ICD-10-CM

## 2023-08-30 DIAGNOSIS — Z98.890 POST-OPERATIVE STATE: ICD-10-CM

## 2023-08-30 PROCEDURE — 93971 EXTREMITY STUDY: CPT | Performed by: PHYSICIAN ASSISTANT

## 2023-09-26 ENCOUNTER — TELEPHONE (OUTPATIENT)
Facility: LOCATION | Age: 48
End: 2023-09-26

## 2023-09-26 NOTE — TELEPHONE ENCOUNTER
S/w pt. She states over the weekend abd pain around umbilicu became sharp and stabbing with movement, took advil with little relief. Denies n/v/d, constipation. She states the pain in her calf is better but the abd pain is the same.       Appt made for tomorrow with PA to assess

## 2023-09-26 NOTE — TELEPHONE ENCOUNTER
Patient had  lap juan on sx 8/19, last week she had stomach pain. Pain got worse, but improved by Sunday night. Couldn't stand up, take breath, couldn't sleep. Level 5/6, took advil. If put hand vertically on rt side of belly button, pain increases. Also issue w/rt leg cramp.  sent for ultrasound. Leg cramp will let up and goes away.     P: [phone]

## 2023-09-27 ENCOUNTER — LAB ENCOUNTER (OUTPATIENT)
Dept: LAB | Facility: HOSPITAL | Age: 48
End: 2023-09-27
Payer: COMMERCIAL

## 2023-09-27 ENCOUNTER — OFFICE VISIT (OUTPATIENT)
Facility: LOCATION | Age: 48
End: 2023-09-27

## 2023-09-27 VITALS — HEART RATE: 106 BPM | TEMPERATURE: 99 F

## 2023-09-27 DIAGNOSIS — R53.83 OTHER FATIGUE: ICD-10-CM

## 2023-09-27 DIAGNOSIS — R10.13 EPIGASTRIC PAIN: ICD-10-CM

## 2023-09-27 DIAGNOSIS — Z98.890 POST-OPERATIVE STATE: Primary | ICD-10-CM

## 2023-09-27 DIAGNOSIS — K80.00 CHOLELITHIASIS AND ACUTE CHOLECYSTITIS WITHOUT OBSTRUCTION: ICD-10-CM

## 2023-09-27 LAB
BASOPHILS # BLD AUTO: 0.02 X10(3) UL (ref 0–0.2)
BASOPHILS NFR BLD AUTO: 0.4 %
EOSINOPHIL # BLD AUTO: 0.12 X10(3) UL (ref 0–0.7)
EOSINOPHIL NFR BLD AUTO: 2.1 %
ERYTHROCYTE [DISTWIDTH] IN BLOOD BY AUTOMATED COUNT: 13.2 %
HCT VFR BLD AUTO: 35.8 %
HGB BLD-MCNC: 11.9 G/DL
IMM GRANULOCYTES # BLD AUTO: 0.01 X10(3) UL (ref 0–1)
IMM GRANULOCYTES NFR BLD: 0.2 %
LYMPHOCYTES # BLD AUTO: 2.48 X10(3) UL (ref 1–4)
LYMPHOCYTES NFR BLD AUTO: 44 %
MCH RBC QN AUTO: 28.6 PG (ref 26–34)
MCHC RBC AUTO-ENTMCNC: 33.2 G/DL (ref 31–37)
MCV RBC AUTO: 86.1 FL
MONOCYTES # BLD AUTO: 0.58 X10(3) UL (ref 0.1–1)
MONOCYTES NFR BLD AUTO: 10.3 %
NEUTROPHILS # BLD AUTO: 2.42 X10 (3) UL (ref 1.5–7.7)
NEUTROPHILS # BLD AUTO: 2.42 X10(3) UL (ref 1.5–7.7)
NEUTROPHILS NFR BLD AUTO: 43 %
PLATELET # BLD AUTO: 224 10(3)UL (ref 150–450)
RBC # BLD AUTO: 4.16 X10(6)UL
WBC # BLD AUTO: 5.6 X10(3) UL (ref 4–11)

## 2023-09-27 PROCEDURE — 85025 COMPLETE CBC W/AUTO DIFF WBC: CPT

## 2023-09-27 PROCEDURE — 99024 POSTOP FOLLOW-UP VISIT: CPT

## 2023-09-27 PROCEDURE — 36415 COLL VENOUS BLD VENIPUNCTURE: CPT

## 2023-09-27 NOTE — PROGRESS NOTES
Post Operative Visit Note       Active Problems  1. Post-operative state    2. Cholelithiasis and acute cholecystitis without obstruction         Chief Complaint   Patient presents with:  Post-Op:   PO - 8/19 lap juan w/ ESH c/o continues to have sharp stabbing abd pain             History of Present Illness   The patient presents for continued care and evaluation following a laparoscopic cholecystectomy with Dr. Marcha Mortimer on August 19, 2023. The patient initially saw Omi [facility] on August 29, 2023. She was having left calf pain at that time. An ultrasound was ordered and was negative for a DVT. The patient states that since her operation, her abdominal pain has never entirely resolved. She typically has pain slightly to the right of the umbilicus. She states the pain is constant but waxes and wanes in intensity. The pain has worsened over the course of the last week. She states she has been unable to do her regular daily activities due to the pain. The pain is worse with activity. She states she has the most difficulty sleeping at night. She is typically a side sleeper, but unable to do so due to her abdominal pain. She states the pain is typically a cramping pain, but when it gets worse with activity, it is a stabbing pain. She does not have associated nausea or vomiting. She denies associated fevers or chills. She is unable to identify an event that occurred last week prior to her abdominal pain worsening. She denies an association with food and her abdominal pain. Her pain is associated with significant fatigue. Additionally, the patient continues to have intermittent left calf cramping. She denies erythema or edema of the left calf. She states it begins to cramp, then she has to stand but is unable to walk until the pain resolves. She has not been taking anything for the pain. She is tolerating a general diet and having regular bowel movements.           Allergies  Sofia Heading has No Known Allergies. Past Medical / Surgical / Social / Family History    The past medical and past surgical history have been reviewed by me today. Past Medical History:   Diagnosis Date    Abdominal pain     Bloating     Constipation     Diarrhea, unspecified     Flatulence/gas pain/belching     Food intolerance     Heartburn     Indigestion     Irregular bowel habits     Nausea     Wears glasses      History reviewed. No pertinent surgical history. The family history and social history have been reviewed by me today. History reviewed. No pertinent family history. Social History    Socioeconomic History      Marital status:     Tobacco Use      Smoking status: Never      Smokeless tobacco: Never    Substance and Sexual Activity      Alcohol use: Yes      Drug use: Never       Current Outpatient Medications:     HYDROcodone-acetaminophen (NORCO) 5-325 MG Oral Tab, Take 1 tablet by mouth every 6 (six) hours as needed for Pain., Disp: 20 tablet, Rfl: 0    cholecalciferol 1.25 MG (86896 UT) Oral Cap, Take by mouth As Directed., Disp: , Rfl:     intrauterine copper contraceptive Intrauterine IUD, 1 Intra Uterine Device by Intrauterine route one time. , Disp: , Rfl:     Ferrous Sulfate 325 (65 Fe) MG Oral Tab, 1 tablet (325 mg total). , Disp: , Rfl:     Omeprazole 40 MG Oral Capsule Delayed Release, Take 1 capsule (40 mg total) by mouth daily. , Disp: 90 capsule, Rfl: 3      Review of Systems  The Review of Systems has been reviewed by me during today. Review of Systems    Physical Findings   LMP 08/09/2023   Physical Exam  Nursing note reviewed. Constitutional:       Appearance: Normal appearance. She is obese. HENT:      Head: Normocephalic and atraumatic. Right Ear: External ear normal.      Left Ear: External ear normal.      Nose: Nose normal.   Eyes:      Conjunctiva/sclera: Conjunctivae normal.   Abdominal:      General: Abdomen is flat. There is no distension.       Palpations: Abdomen is soft. There is no mass. Tenderness: There is abdominal tenderness in the right upper quadrant and epigastric area. Hernia: No hernia is present. Comments: Clinical exam of the patient's abdomen reveals it to be soft, nondistended without tympany to percussion. She has mild tenderness to palpation in the right abdomen. One of her incision sites had a small amount of suture poking out, so I took the opportunity at today's visit to remove this. The remainder of her incisions are clean, dry, intact without surrounding erythema or cellulitis. Neurological:      Mental Status: She is alert. Psychiatric:         Mood and Affect: Mood normal.         Behavior: Behavior normal.             Assessment   Post-operative state  (primary encounter diagnosis)  Cholelithiasis and acute cholecystitis without obstruction      Plan     I had a prolonged conversation with the patient regarding her symptoms. I expressed the importance of maintaining adequate caloric intake and hydration. I will also order a HIDA scan. I explained that it is unlikely for her to have a bile leak, but I would like to further evaluate her symptoms. I will also order a CBC to ensure she is not anemic. I would like the patient to follow-up with a gastroenterologist.  I recommend she see someone with [facility] to further evaluate her abdominal symptoms. The patient may take ibuprofen and Tylenol as needed for pain management. I also recommend she apply a hot pack over the abdomen to see if this helps alleviate some of her symptoms. We will have the patient schedule follow-up with Dr. Lv Oates in approximately 2 weeks to discuss her imaging. The patient expressed understanding with the above plan. All questions and concerns were addressed. No orders of the defined types were placed in this encounter. Imaging & Referrals   None    Follow Up  No follow-ups on file.     Claudeen Burow, PA-C

## 2023-10-03 ENCOUNTER — HOSPITAL ENCOUNTER (OUTPATIENT)
Dept: NUCLEAR MEDICINE | Facility: HOSPITAL | Age: 48
Discharge: HOME | End: 2023-10-03
Payer: COMMERCIAL

## 2023-10-03 ENCOUNTER — PATIENT MESSAGE (OUTPATIENT)
Facility: LOCATION | Age: 48
End: 2023-10-03

## 2023-10-03 DIAGNOSIS — R10.13 EPIGASTRIC PAIN: ICD-10-CM

## 2023-10-03 PROCEDURE — 78226 HEPATOBILIARY SYSTEM IMAGING: CPT

## 2023-10-03 NOTE — TELEPHONE ENCOUNTER
From: Leilani Dimas  To: Cnidi Jarrett  Sent: 10/3/2023 3:32 PM CDT  Subject: Question regarding NM HEPATOBILIARY SCAN (CPT=78226)    Hello,   The pain that I have a big issue with started after surgery. I didn't have this pain before. I still cannot sleep on my stomach, on the left side. Cannot turn in bed to the left. Cannot get up from bed normally, need to hold on. As I said the pain is close to the belly button and goes around sometimes too. When I press on belly button it is painful. That is one of the cuts. Also my stomach still bigger than it used to be. I became bigger after surgery and still big. I understand that are by goldbladder is healing nicely and that's wonderful. The pain still from surgery by belly button has to be fixed asap. I cannot do anything normally. What other test I can do to figure out what is wrong? I need the pain to go away!

## 2023-10-04 ENCOUNTER — OFFICE VISIT (OUTPATIENT)
Dept: SURGERY | Facility: CLINIC | Age: 48
End: 2023-10-04

## 2023-10-04 VITALS — HEART RATE: 91 BPM | TEMPERATURE: 98 F

## 2023-10-04 DIAGNOSIS — R10.11 RUQ PAIN: Primary | ICD-10-CM

## 2023-10-04 DIAGNOSIS — Z98.890 POST-OPERATIVE STATE: ICD-10-CM

## 2023-10-04 PROBLEM — R10.13 EPIGASTRIC PAIN: Status: RESOLVED | Noted: 2023-06-06 | Resolved: 2023-10-04

## 2023-10-04 PROCEDURE — 99024 POSTOP FOLLOW-UP VISIT: CPT | Performed by: SURGERY

## 2023-10-04 RX ORDER — IBUPROFEN 800 MG/1
800 TABLET ORAL EVERY 8 HOURS
Qty: 21 TABLET | Refills: 0 | Status: SHIPPED | OUTPATIENT
Start: 2023-10-04 | End: 2023-10-11

## 2023-12-04 PROBLEM — R10.9 STOMACH DISCOMFORT: Status: ACTIVE | Noted: 2023-12-04

## 2023-12-04 PROBLEM — R14.1 ABDOMINAL GAS PAIN: Status: ACTIVE | Noted: 2023-12-04

## 2023-12-04 PROBLEM — R12 HEARTBURN: Status: ACTIVE | Noted: 2023-12-04

## 2023-12-18 ENCOUNTER — TELEPHONE (OUTPATIENT)
Facility: LOCATION | Age: 48
End: 2023-12-18

## 2023-12-18 NOTE — TELEPHONE ENCOUNTER
Good afternoon,  The patient recently called stating she is experiencing, cramps, pain, heaviness, numbness in the patients legs. The patient states it is hard to walk as well.     Call back # [phone]

## 2023-12-18 NOTE — TELEPHONE ENCOUNTER
S/w patient to let her know that per Dr Marcha Mortimer she should follow up with her PCP. Verbalized understanding.

## 2024-05-18 ENCOUNTER — WALK IN (OUTPATIENT)
Dept: URGENT CARE | Age: 49
End: 2024-05-18
Attending: EMERGENCY MEDICINE

## 2024-05-18 VITALS
TEMPERATURE: 98.6 F | BODY MASS INDEX: 30.9 KG/M2 | DIASTOLIC BLOOD PRESSURE: 82 MMHG | WEIGHT: 180 LBS | SYSTOLIC BLOOD PRESSURE: 127 MMHG | HEART RATE: 76 BPM | RESPIRATION RATE: 16 BRPM | OXYGEN SATURATION: 99 %

## 2024-05-18 DIAGNOSIS — H66.001 RIGHT ACUTE SUPPURATIVE OTITIS MEDIA: Primary | ICD-10-CM

## 2024-05-18 DIAGNOSIS — J06.9 VIRAL URI WITH COUGH: ICD-10-CM

## 2024-05-18 RX ORDER — AMOXICILLIN 875 MG/1
875 TABLET, COATED ORAL 2 TIMES DAILY
Qty: 20 TABLET | Refills: 0 | Status: SHIPPED | OUTPATIENT
Start: 2024-05-18 | End: 2024-05-28

## 2024-05-18 RX ORDER — DEXTROMETHORPHAN HYDROBROMIDE AND PROMETHAZINE HYDROCHLORIDE 15; 6.25 MG/5ML; MG/5ML
5 SYRUP ORAL 4 TIMES DAILY PRN
Qty: 120 ML | Refills: 1 | Status: SHIPPED | OUTPATIENT
Start: 2024-05-18

## 2024-05-18 ASSESSMENT — PAIN SCALES - GENERAL: PAINLEVEL: 0

## 2024-05-21 ENCOUNTER — TELEPHONE (OUTPATIENT)
Dept: URGENT CARE | Age: 49
End: 2024-05-21

## 2024-08-28 PROBLEM — M54.50 CHRONIC LOWER BACK PAIN: Status: ACTIVE | Noted: 2024-08-28

## 2024-08-28 PROBLEM — G89.29 CHRONIC LOWER BACK PAIN: Status: ACTIVE | Noted: 2024-08-28

## 2024-10-29 ENCOUNTER — LAB SERVICES (OUTPATIENT)
Age: 49
End: 2024-10-29

## 2024-10-29 DIAGNOSIS — R10.2 PELVIC PRESSURE IN FEMALE: ICD-10-CM

## 2024-10-29 PROCEDURE — 81003 URINALYSIS AUTO W/O SCOPE: CPT | Performed by: CLINICAL MEDICAL LABORATORY

## 2024-10-30 DIAGNOSIS — M54.50 LOW BACK PAIN, UNSPECIFIED: Primary | ICD-10-CM

## 2024-10-30 LAB
APPEARANCE UR: CLEAR
BILIRUB UR QL STRIP: NEGATIVE
COLOR UR: YELLOW
GLUCOSE UR STRIP-MCNC: NEGATIVE MG/DL
HGB UR QL STRIP: NEGATIVE
KETONES UR STRIP-MCNC: NEGATIVE MG/DL
LEUKOCYTE ESTERASE UR QL STRIP: NEGATIVE
NITRITE UR QL STRIP: NEGATIVE
PH UR STRIP: 6.5 [PH] (ref 5–7)
PROT UR STRIP-MCNC: ABNORMAL MG/DL
SP GR UR STRIP: 1.02 (ref 1–1.03)
UROBILINOGEN UR STRIP-MCNC: 0.2 MG/DL

## 2024-11-05 ENCOUNTER — HOSPITAL ENCOUNTER (OUTPATIENT)
Dept: PHYSICAL MEDICINE AND REHAB | Age: 49
Discharge: STILL ADMITTED OR ADMINISTRATIVE | End: 2024-11-05
Attending: ORTHOPAEDIC SURGERY

## 2024-11-05 PROCEDURE — 97110 THERAPEUTIC EXERCISES: CPT

## 2024-11-05 PROCEDURE — 97161 PT EVAL LOW COMPLEX 20 MIN: CPT

## 2024-11-05 ASSESSMENT — ENCOUNTER SYMPTOMS
QUALITY: ACHE
PAIN FREQUENCY: INTERMITTENT
ALLEVIATING FACTORS: CHANGE IN POSITION
PAIN LOCATION: LOW BACK AND BILATERAL LEGS
QUALITY: TINGLING
QUALITY: DISCOMFORT
ALLEVIATING FACTORS: HEAT
QUALITY: NUMBNESS

## 2024-11-12 ENCOUNTER — APPOINTMENT (OUTPATIENT)
Dept: PHYSICAL MEDICINE AND REHAB | Age: 49
End: 2024-11-12
Attending: ORTHOPAEDIC SURGERY

## 2024-11-19 ENCOUNTER — APPOINTMENT (OUTPATIENT)
Dept: PHYSICAL MEDICINE AND REHAB | Age: 49
End: 2024-11-19
Attending: ORTHOPAEDIC SURGERY

## 2024-11-23 PROBLEM — H66.92 OTITIS MEDIA OF LEFT EAR: Status: ACTIVE | Noted: 2024-11-23

## 2024-11-26 ENCOUNTER — APPOINTMENT (OUTPATIENT)
Dept: PHYSICAL MEDICINE AND REHAB | Age: 49
End: 2024-11-26
Attending: ORTHOPAEDIC SURGERY

## 2024-12-03 ENCOUNTER — APPOINTMENT (OUTPATIENT)
Dept: PHYSICAL MEDICINE AND REHAB | Age: 49
End: 2024-12-03
Attending: ORTHOPAEDIC SURGERY

## 2025-03-31 ENCOUNTER — OFFICE VISIT (OUTPATIENT)
Dept: OBGYN CLINIC | Facility: CLINIC | Age: 50
End: 2025-03-31
Payer: COMMERCIAL

## 2025-03-31 VITALS
SYSTOLIC BLOOD PRESSURE: 109 MMHG | HEIGHT: 65 IN | WEIGHT: 184 LBS | DIASTOLIC BLOOD PRESSURE: 75 MMHG | BODY MASS INDEX: 30.66 KG/M2 | HEART RATE: 75 BPM

## 2025-03-31 DIAGNOSIS — N76.2 ACUTE VULVITIS: ICD-10-CM

## 2025-03-31 DIAGNOSIS — R35.0 URINARY FREQUENCY: Primary | ICD-10-CM

## 2025-03-31 DIAGNOSIS — N39.41 URGE INCONTINENCE: ICD-10-CM

## 2025-03-31 DIAGNOSIS — N76.0 VAGINITIS AND VULVOVAGINITIS: ICD-10-CM

## 2025-03-31 LAB
APPEARANCE: CLEAR
BILIRUBIN: NEGATIVE
GLUCOSE (URINE DIPSTICK): NEGATIVE MG/DL
KETONES (URINE DIPSTICK): NEGATIVE MG/DL
MULTISTIX LOT#: ABNORMAL NUMERIC
NITRITE, URINE: NEGATIVE
PH, URINE: 5.5 (ref 4.5–8)
PROTEIN (URINE DIPSTICK): NEGATIVE MG/DL
SPECIFIC GRAVITY: >=1.03 (ref 1–1.03)
URINE-COLOR: YELLOW
UROBILINOGEN,SEMI-QN: 0.2 MG/DL (ref 0–1.9)

## 2025-03-31 NOTE — PROGRESS NOTES
[facility]  Obstetrics and Gynecology   History & Physical      Chief complaint:   Chief Complaint   Patient presents with    Gyn Problem     Patient reports one week of vaginal itching, burning,abdominal pain/discomfort, nauseous.          Subjective:     HPI: Charla Rodríguez is a 49 year old  presenting for acute vulvitis.    Her PCP is None Pcp.    5 days of intense vulvar itching  No abnormal discharge, no foul smell   +urge  +frequency  Has copper IUD   Vagisil was a little helpful - used it a couple times     Normal periods -  LMP     Uses water only to clean   Mostly cotton underwear  No underwear at night    Reports she had these symptoms right before her period the last time too.        OB History  OB History    Para Term  AB Living   2 2 2 0 0 2   SAB IAB Ectopic Multiple Live Births   0 0 0 0 2     OB History    Para Term  AB Living   2 2 2     2   SAB IAB Ectopic Multiple Live Births           2      # Outcome Date GA Lbr Eduardo/2nd Weight Sex Type Anes PTL Lv   2 Term 05    F Vag-Spont   DORINDA   1 Term 03    F Vag-Spont   DORINDA       ROS negative unless otherwise stated above    Meds:  Medications Ordered Prior to Encounter[1]    PMH:  Past Medical History:    Abdominal pain    Bad breath    Bloating    Body piercing    Constipation    Diarrhea, unspecified    Flatulence/gas pain/belching    Food intolerance    Heartburn    Indigestion    Irregular bowel habits    Nausea    Wears glasses       All:  Allergies[2]    PSH:  Past Surgical History:   Procedure Laterality Date    Egd         Social History:  Social History     Socioeconomic History    Marital status:    Tobacco Use    Smoking status: Never    Smokeless tobacco: Never   Substance and Sexual Activity    Alcohol use: Yes     Comment: social    Drug use: Never    Sexual activity: Yes     Partners: Male     Birth control/protection: Paragard     Social Drivers of Health      Received  from [facility]        Family History:  History reviewed. No pertinent family history.    Immunization History:  Immunization History   Administered Date(s) Administered    Covid-19 Vaccine Pfizer 30 mcg/0.3 ml 05/27/2021, 09/19/2021         Objective:     Vitals:    03/31/25 1429   BP: 109/75   Pulse: 75   Weight: 184 lb (83.5 kg)   Height: 65\"       Body mass index is 30.62 kg/m².    Physical Exam:     General: normal appearance  HEENT: normocephalic, no male pattern baldness, no acne  Breast: normal contour  Respiratory: normal work of breathing, no extra use of accessory muscles  Cardiac: normal rate  Abdominal: Nontender to palpation  MSK: normal range of motion  Neuro: normal movement, normal sensory  Skin: no abnormalities seen    Pelvic:  Speculum Exam:  - mildly erythematous and edematous vulva   - normal appearing urethral meatus, urethra  - Stage V jaun pubic hair development  - normal appearing vagina, well estrogenized with ruggae, physiologic discharge  -  cervix without masses   Bimanual exam:  - uterus is mildly tender and mobile and with normal descent.  No masses appreciated.  No uterine or adnexal tenderness.  No bladder pain  - Pelvic floor is non tender      Labs:  Lab Results   Component Value Date    WBC 5.6 09/27/2023    RBC 4.16 09/27/2023    HGB 11.9 (L) 09/27/2023    HCT 35.8 09/27/2023    MCV 86.1 09/27/2023    MCH 28.6 09/27/2023    MCHC 33.2 09/27/2023    RDW 13.2 09/27/2023    .0 09/27/2023        Lab Results   Component Value Date     (H) 08/19/2023    BUN 8 08/19/2023    BUNCREA 14.0 11/30/2020    CREATSERUM 0.79 08/19/2023    ANIONGAP 5 08/19/2023    CA 8.8 08/19/2023    OSMOCALC 281 08/19/2023    ALKPHO 68 08/19/2023    AST 12 (L) 08/19/2023    ALT 22 08/19/2023    BILT 0.4 08/19/2023    TP 7.7 08/19/2023    ALB 3.8 08/19/2023    GLOBULIN 3.9 08/19/2023     08/19/2023    K 3.4 (L) 08/19/2023     08/19/2023    CO2 27.0 08/19/2023        Lab Results   Component Value Date    CHOLEST 219.00 (H) 2020    TRIG 135.00 2020    HDL 69 2020     2020    VLDL 27 2020        Lab Results   Component Value Date    T4F 1.18 2020    TSH 1.755 2020        Assessment:     Charla Rodríguez is a 49 year old  female presenting for acute vulvitis.    [ ] vaginitis panel with appropriate treatment  [ ] if negative panel, plan topical steroids  [ ] lifestyle: water only to clean, pat (do not wipe), sitz baths ok, vaseline ok as barrier cream, non-fragrant hypoallergenic detergent      Joan Mcdaniel MD   [facility] - OBGYN    Note to patient and family:  The  Century Cures Act makes medical notes available to patients in the interest of transparency.  However, please be advised that this is a medical document.  It is intended as a peer to peer communication.  It is written in medical language and may contain abbreviations or verbiage that are technical and unfamiliar.  It may appear blunt or direct.  Medical documents are intended to carry relevant information, facts as evident, and the clinical opinion of the practitioner.         [1]   Current Outpatient Medications on File Prior to Visit   Medication Sig Dispense Refill    cholecalciferol 1.25 MG (87131 UT) Oral Cap Take by mouth As Directed.      intrauterine copper contraceptive Intrauterine IUD 1 Intra Uterine Device by Intrauterine route one time.      Ferrous Sulfate 325 (65 Fe) MG Oral Tab 1 tablet (325 mg total).      HYDROcodone-acetaminophen (NORCO) 5-325 MG Oral Tab Take 1 tablet by mouth every 6 (six) hours as needed for Pain. 20 tablet 0     No current facility-administered medications on file prior to visit.   [2] No Known Allergies

## 2025-04-01 LAB
BV BACTERIA DNA VAG QL NAA+PROBE: NEGATIVE
C GLABRATA DNA VAG QL NAA+PROBE: POSITIVE
C KRUSEI DNA VAG QL NAA+PROBE: NEGATIVE
CANDIDA DNA VAG QL NAA+PROBE: POSITIVE
T VAGINALIS DNA VAG QL NAA+PROBE: NEGATIVE

## 2025-06-17 ENCOUNTER — TELEPHONE (OUTPATIENT)
Dept: INTERNAL MEDICINE CLINIC | Facility: CLINIC | Age: 50
End: 2025-06-17

## 2025-06-17 ENCOUNTER — SPINE CENTER NAVIGATION (OUTPATIENT)
Age: 50
End: 2025-06-17

## 2025-06-17 ENCOUNTER — OFFICE VISIT (OUTPATIENT)
Dept: INTERNAL MEDICINE CLINIC | Facility: CLINIC | Age: 50
End: 2025-06-17
Payer: COMMERCIAL

## 2025-06-17 VITALS
TEMPERATURE: 98 F | OXYGEN SATURATION: 97 % | RESPIRATION RATE: 18 BRPM | DIASTOLIC BLOOD PRESSURE: 72 MMHG | HEART RATE: 83 BPM | HEIGHT: 64.5 IN | SYSTOLIC BLOOD PRESSURE: 112 MMHG | BODY MASS INDEX: 31.71 KG/M2 | WEIGHT: 188 LBS

## 2025-06-17 DIAGNOSIS — Z12.83 SCREENING FOR SKIN CANCER: ICD-10-CM

## 2025-06-17 DIAGNOSIS — R29.898 LEG HEAVINESS: ICD-10-CM

## 2025-06-17 DIAGNOSIS — Z12.31 ENCOUNTER FOR SCREENING MAMMOGRAM FOR MALIGNANT NEOPLASM OF BREAST: ICD-10-CM

## 2025-06-17 DIAGNOSIS — Z13.228 SCREENING FOR METABOLIC DISORDER: ICD-10-CM

## 2025-06-17 DIAGNOSIS — G89.29 CHRONIC MIDLINE LOW BACK PAIN WITHOUT SCIATICA: ICD-10-CM

## 2025-06-17 DIAGNOSIS — E61.1 IRON DEFICIENCY: ICD-10-CM

## 2025-06-17 DIAGNOSIS — M54.50 CHRONIC MIDLINE LOW BACK PAIN WITHOUT SCIATICA: ICD-10-CM

## 2025-06-17 DIAGNOSIS — Z13.0 SCREENING FOR BLOOD DISEASE: ICD-10-CM

## 2025-06-17 DIAGNOSIS — Z00.00 WELLNESS EXAMINATION: Primary | ICD-10-CM

## 2025-06-17 DIAGNOSIS — K21.9 GASTROESOPHAGEAL REFLUX DISEASE, UNSPECIFIED WHETHER ESOPHAGITIS PRESENT: ICD-10-CM

## 2025-06-17 DIAGNOSIS — R21 SKIN RASH: ICD-10-CM

## 2025-06-17 DIAGNOSIS — G89.29 CHRONIC MIDLINE LOW BACK PAIN WITHOUT SCIATICA: Primary | ICD-10-CM

## 2025-06-17 DIAGNOSIS — M54.50 CHRONIC MIDLINE LOW BACK PAIN WITHOUT SCIATICA: Primary | ICD-10-CM

## 2025-06-17 DIAGNOSIS — Z13.1 SCREENING FOR DIABETES MELLITUS: ICD-10-CM

## 2025-06-17 DIAGNOSIS — Z13.220 SCREENING, LIPID: ICD-10-CM

## 2025-06-17 DIAGNOSIS — Z13.29 SCREENING FOR THYROID DISORDER: ICD-10-CM

## 2025-06-17 DIAGNOSIS — Z76.89 ENCOUNTER TO ESTABLISH CARE: Primary | ICD-10-CM

## 2025-06-17 DIAGNOSIS — E55.9 VITAMIN D DEFICIENCY: ICD-10-CM

## 2025-06-17 PROBLEM — R14.1 ABDOMINAL GAS PAIN: Status: RESOLVED | Noted: 2023-12-04 | Resolved: 2025-06-17

## 2025-06-17 PROBLEM — R10.9 STOMACH DISCOMFORT: Status: RESOLVED | Noted: 2023-12-04 | Resolved: 2025-06-17

## 2025-06-17 PROBLEM — R68.81 EARLY SATIETY: Status: RESOLVED | Noted: 2023-06-06 | Resolved: 2025-06-17

## 2025-06-17 PROBLEM — R12 HEARTBURN: Status: RESOLVED | Noted: 2023-12-04 | Resolved: 2025-06-17

## 2025-06-17 PROBLEM — K80.00 CHOLELITHIASIS AND ACUTE CHOLECYSTITIS WITHOUT OBSTRUCTION: Status: RESOLVED | Noted: 2023-08-19 | Resolved: 2025-06-17

## 2025-06-17 PROBLEM — Z98.890 POST-OPERATIVE STATE: Status: RESOLVED | Noted: 2023-09-27 | Resolved: 2025-06-17

## 2025-06-17 PROBLEM — R10.11 RUQ PAIN: Status: RESOLVED | Noted: 2023-06-06 | Resolved: 2025-06-17

## 2025-06-17 PROCEDURE — 99203 OFFICE O/P NEW LOW 30 MIN: CPT | Performed by: INTERNAL MEDICINE

## 2025-06-17 PROCEDURE — 3008F BODY MASS INDEX DOCD: CPT | Performed by: INTERNAL MEDICINE

## 2025-06-17 PROCEDURE — 3078F DIAST BP <80 MM HG: CPT | Performed by: INTERNAL MEDICINE

## 2025-06-17 PROCEDURE — G2211 COMPLEX E/M VISIT ADD ON: HCPCS | Performed by: INTERNAL MEDICINE

## 2025-06-17 PROCEDURE — 3074F SYST BP LT 130 MM HG: CPT | Performed by: INTERNAL MEDICINE

## 2025-06-17 RX ORDER — PANTOPRAZOLE SODIUM 40 MG/1
40 TABLET, DELAYED RELEASE ORAL
Qty: 14 TABLET | Refills: 0 | Status: SHIPPED | OUTPATIENT
Start: 2025-06-17 | End: 2025-07-01

## 2025-06-17 NOTE — PROGRESS NOTES
The following individual(s) verbally consented to be recorded using ambient AI listening technology and understand that they can each withdraw their consent to this listening technology at any point by asking the clinician to turn off or pause the recording:    Patient name: Charla Rodríguez  Subjective:   Charla Rodríguez is a 50 year old female who presents for New Patient (Rm-9, aw, [facility] )     History/Other:   History of Present Illness  Charla Rodríguez is a 50 year old female who presents to [facility] and evaluation of back pain.    She has a history of vitamin D and iron deficiency, with previous episodes of anemia, and is interested in checking her current levels as it has been a while since her last evaluation.    She experiences chronic back pain. The pain is exacerbated by sleeping on her stomach without a pillow beneath her, leading to stiffness and difficulty getting up in the morning. The pain is severe enough to prevent her from standing or walking for long periods, and sometimes requires assistance to move. She has tried using a belt for support. Previous imaging included an MR but record is not available and an x-ray, which she was told did not show everything. She has not undergone physical therapy. No tingling or numbness in her legs, but she reports heaviness in legs, stiffness, and pain in the lower back.    She reports episodes of sharp abdominal pain, particularly after consuming certain foods like eggs and fried chicken.  She also notes symptoms suggestive of acid reflux, such as a burning sensation in her throat at night.    She has a persistent rash under her ear, described as 'some bumps' that have not resolved. She seeks a dermatology referral for further evaluation.    She has a history of kidney stones and is concerned about potential kidney issues, especially after experiencing bilateral back pain that radiated to the front. She is cautious with her diet to avoid triggering  symptoms.     Chief Complaint Reviewed and Verified  Nursing Notes Reviewed and   Verified  Tobacco Reviewed  Allergies Reviewed  Medications Reviewed    Medical History Reviewed  Surgical History Reviewed  OB Status Reviewed    Family History Reviewed  Social History Reviewed         Tobacco:  She has never smoked tobacco.    Current Medications[1]      Review of Systems:  Review of Systems  No f/c/chest pain or sob. No cough. No abd pain/n/v/d. No ha or dizziness. No numbness, tingling, or weakness.     Objective:   /72 (BP Location: Left arm, Patient Position: Sitting, Cuff Size: adult)   Pulse 83   Temp 97.7 °F (36.5 °C) (Temporal)   Resp 18   Ht 5' 4.5\" (1.638 m)   Wt 188 lb (85.3 kg)   LMP 06/03/2025 (Exact Date)   SpO2 97%   Breastfeeding No   BMI 31.77 kg/m²  Estimated body mass index is 31.77 kg/m² as calculated from the following:    Height as of this encounter: 5' 4.5\" (1.638 m).    Weight as of this encounter: 188 lb (85.3 kg).  Physical Exam  Constitutional: Oriented to person, place, and time. No distress.   Pulmonary/Chest: Effort normal. No respiratory distress.  Abdominal: Soft. Bowel sounds are present. Non tender, no masses, no organomegaly or hernias.  Musculoskeletal: No edema in bilateral lower extremities. No tenderness of thoracic, lumbar spinous processes or bilateral SI joints. Negative straight leg raise bilaterally although there is decreased movements in the hips.  Skin: Skin is warm and dry. No rash on visualized skin.  Psychiatric: Normal mood and affect.     Assessment & Plan:       Assessment & Plan  1. Encounter to establish care (Primary)  2. Vitamin D deficiency  -     Vitamin D; Future; Expected date: 06/17/2025  -     Vitamin D, 25-Hydroxy  3. Iron deficiency  -     Iron And Tibc  -     Ferritin  4. Screening for skin cancer  -     Cancel: DERM - INTERNAL  -     Derm Referral - External  5. Skin rash  -     Derm Referral - External  6. Encounter for  screening mammogram for malignant neoplasm of breast  -     [facility] ANJELICA 2D+3D SCREENING BILAT (CPT=77067/03847); Future; Expected date: 06/17/2025  7. Chronic midline low back pain without sciatica- Per patient had prior MRI lumbar but result is not available in Epic/[facility]. Referring to spine navigator for specialist evaluation.  -     [facility] REFERRAL FOR NAVIGATION  8. Leg heaviness  -     SPINE [facility]ENTER [facility] REFERRAL FOR NAVIGATION  9. Gastroesophageal reflux disease, unspecified whether esophagitis present- Reported GI and even back pain after consumption of foods like eggs and fried chicken raise concern for GERD. Trial of PPI for 2 weeks and then close follow up for reassessment.  -     Pantoprazole Sodium; Take 1 tablet (40 mg total) by mouth every morning before breakfast for 14 days.  Dispense: 14 tablet; Refill: 0      No follow-ups on file.      Chava Cooper DO, 6/17/2025, 1:12 PM          [1]   Current Outpatient Medications   Medication Sig Dispense Refill    pantoprazole 40 MG Oral Tab EC Take 1 tablet (40 mg total) by mouth every morning before breakfast for 14 days. 14 tablet 0    cholecalciferol 1.25 MG (04281 UT) Oral Cap Take by mouth As Directed.      HYDROcodone-acetaminophen (NORCO) 5-325 MG Oral Tab Take 1 tablet by mouth every 6 (six) hours as needed for Pain. (Patient not taking: Reported on 6/17/2025) 20 tablet 0    intrauterine copper contraceptive Intrauterine IUD 1 Intra Uterine Device by Intrauterine route one time.      Ferrous Sulfate 325 (65 Fe) MG Oral Tab 1 tablet (325 mg total). (Patient not taking: Reported on 6/17/2025)

## 2025-06-17 NOTE — PROGRESS NOTES
Spine Center Referral Navigation Encounter Note    Referred by: Chava Cooper,      Imaging: None  If imaging done at an external facility, instructed patient to bring disc of MRI to appointment.     Previously Seen Spine Care Providers: None    Referred to: ERICA Pino in Neurosurgery     Information below is patient reported.     [facility]  Are you currently experiencing any of the following symptoms?      No    Is your condition due to an injury that occurred at work or is your care for this condition being coordinated by Worker's Compensation?      No    Are you looking for a second surgical opinion?      No    Have you had surgery on your spine (neck or back) in the last 12 months?      No    In the last several weeks, have you experienced weakness or balance issues that have caused falls or difficulty lifting your legs or feet (lower body) and/or weakness that has caused you to drop items or caused changes in handwriting (upper body)?      Lower body  [Patient states feels like she is treading water while walking.]   Have you had an MRI of the lumbar spine (low back) in the last year?      No    : Patient needs to be seen by Surgical team. Does patient require a new visit or established visit?      New patient visit    Are you closer to [facility] or [facility]?      [facility]         6/17- Spoke to patient and was able to schedule for  7/2 at 1 pm. She told me she prefers mid morning to early afternoon for appointments. She agreed to be placed on wait list in case something sooner becomes available.    
You can access the FollowMyHealth Patient Portal offered by [facility] by registering at the following website: [email or web address]. By joining [facility]’s FollowMyHealth portal, you will also be able to view your health information using other applications (apps) compatible with our system.

## 2025-07-09 ENCOUNTER — OFFICE VISIT (OUTPATIENT)
Dept: SURGERY | Facility: CLINIC | Age: 50
End: 2025-07-09
Payer: COMMERCIAL

## 2025-07-09 DIAGNOSIS — M54.16 CHRONIC LUMBAR RADICULOPATHY: Primary | ICD-10-CM

## 2025-07-09 PROCEDURE — 99203 OFFICE O/P NEW LOW 30 MIN: CPT

## 2025-07-09 RX ORDER — MELOXICAM 7.5 MG/1
7.5 TABLET ORAL DAILY
Qty: 30 TABLET | Refills: 0 | Status: SHIPPED | OUTPATIENT
Start: 2025-07-09

## 2025-07-09 NOTE — PROGRESS NOTES
[facility]  Neurosurgery Consultation  2025    Charla Rodríguez PCP:  Chava Cooper,     1975 MRN [id number]     Reason for Visit: Chronic lumbar radiculopathy    HPI     Charla Rodríguez is a very pleasant 50 year old female with no pertinent PMH who presents for Neurosurgical consultation for chronic lumbar radiculopathy.    Patient reports initial onset of symptoms following cholecystectomy 2.5 years ago.  She reports constant midline LBP, varying in severity based on her level of activity.  She reports intermittent pain, numbness, and tingling to the B/L groin and down the anterior aspect of both lower extremities (R > L), extending to the feet. She reports B/L leg heaviness and states her legs fatigue quickly when walking. Symptoms occur unrelated to time of day. Aggravating factors include prolonged standing, sitting, and sleeping on her stomach. Rest, lying down, and heating pads to the lower back offer some relief.  Patient states she had a lumbar MRI completed 2 years ago and was told she had disease involving multiple levels, however imaging and reports are not available for review.  Patient has no upper or lower extremity weakness.  No changes in bowel/bladder habits or saddle anesthesia.  She is ambulatory with a steady gait. No prior dedicated spine PT, injections, or history of spine surgeries.     HISTORY     Past Medical History[1]   Past Surgical History[2]   Family History[3]   Social Hx on file[4]   Allergies[5]     CURRENT MEDICATIONS     Current Medications[6]     REVIEW OF SYSTEMS     Comprehensive review of systems done. Negative except what is outlined in the above HPI.     PHYSICAL EXAMIMATION     VITALS:  vitals were not taken for this visit.     GENERAL: No apparent distress, non-toxic appearing. Sitting comfortably in the examination chair.     MUSCULOSKELETAL: Even tone. Moving bilateral upper and lower extremities spontaneously and against  resistance.    SKIN: Warm, dry.     NEURO: Alert and oriented x3.  Face is symmetric.       SPINE:  Gait/Coordination:  Gait intact, non-antalgic.   Able to deep knee bend on one foot bilaterally, reports equal strength.    Reports equal strength with toe and heel balance bilaterally.    Sensation:   Sensation to light touch intact to bilateral upper and lower extremities.    ROM:   Lumbar Flexion   + Pain   Lumbar Extension + Pain   Lumbar Rotation  Pain free    LOWER EXTREMITY STRENGTH:    Iliospoas  Hamstrings  Quads  D-flexion  P-flexion EHL     Right 5 5 5 5 5 5     Left 5 5 5 5 5 5     UPPER EXTREMITY STRENGTH:    Deltoid  Biceps  Triceps    Finger abduction     Right 5 5 5 5 5     Left 5 5 5 5 5     DTRs:     Biceps    Triceps   Brachioradialis     Patellar     Ankle     Right       2+         2+            2+         2+        2+     Left       2+         2+             2+         2+        2+      IMAGING:     No new imaging to review.    MEDICAL DECISION MAKING:     ASSESSMENT:  1. Chronic lumbar radiculopathy      PLAN:   XR lumbar spine with flex/ext.  Start PT  Rx for Meloxicam 7.5 mg daily  Advised to take medication with food to minimize GI upset.  Instructed not to take other NSAID's while taking this medication.  F/u in 6 weeks after PT, or sooner for any new, worsening, or concerning  signs or symptoms.   If symptoms persist, will obtain MRI lumbar spine.     I reviewed the plan of care with the patient at length. All questions and concerns were addressed at this time. The patient verbalized understanding, agrees with the plan, and was appreciative.    Total visit time: 35 minutes; More than 50% spent coordinating care, counseling, reviewing imaging and discussing medication therapy.     Izzy Santana, MSN, APRN, FNP-[facility]  Neurosurgery   [address] [place] [address], [address]  [place] [address]  [phone]          [1]   Past Medical History:   Abdominal pain     Bad breath    Bloating    Body piercing    Constipation    Diarrhea, unspecified    Flatulence/gas pain/belching    Food intolerance    Heartburn    Indigestion    Irregular bowel habits    Nausea    Wears glasses   [2]   Past Surgical History:  Procedure Laterality Date    Egd     [3] No family history on file.  [4]   Social History  Socioeconomic History    Marital status:    Tobacco Use    Smoking status: Never     Passive exposure: Never    Smokeless tobacco: Never   Vaping Use    Vaping status: Never Used   Substance and Sexual Activity    Alcohol use: Yes     Comment: social cage 6/17/25    Drug use: Never    Sexual activity: Yes     Partners: Male     Birth control/protection: Paragard   Other Topics Concern    Caffeine Concern No    Exercise No    Seat Belt Yes    Special Diet No    Stress Concern No    Weight Concern No   [5] No Known Allergies  [6]   Current Outpatient Medications   Medication Sig Dispense Refill    HYDROcodone-acetaminophen (NORCO) 5-325 MG Oral Tab Take 1 tablet by mouth every 6 (six) hours as needed for Pain. (Patient not taking: Reported on 6/17/2025) 20 tablet 0    cholecalciferol 1.25 MG (58689 UT) Oral Cap Take by mouth As Directed.      intrauterine copper contraceptive Intrauterine IUD 1 Intra Uterine Device by Intrauterine route one time.      Ferrous Sulfate 325 (65 Fe) MG Oral Tab 1 tablet (325 mg total). (Patient not taking: Reported on 6/17/2025)

## 2025-07-09 NOTE — PROGRESS NOTES
New patient:  Reason for visit: lower back pain     Estimated time of onset:  states she had the pain for 2 years     Numeric Rating Scale:   Pain at Present:  3/10                                                                                                                       Distribution of Pain:    bilateral some times she will have N/T in her legs    Past Treatments for Current Pain Condition:     No PT in the last 6 months   No injections in the last year   No back surgeries

## 2025-07-09 NOTE — PATIENT INSTRUCTIONS
Refill policies:    Allow 2-3 business days for refills; controlled substances may take longer.  Contact your pharmacy at least 5 days prior to running out of medication and have them send an electronic request or submit request through the “request refill” option in your [employer] account.  Refills are not addressed on weekends; covering physicians do not authorize routine medications on weekends.  No narcotics or controlled substances are refilled after noon on Fridays or by on call physicians.  By law, narcotics must be electronically prescribed.  A 30 day supply with no refills is the maximum allowed.  If your prescription is due for a refill, you may be due for a follow up appointment.  To best provide you care, patients receiving routine medications need to be seen at least once a year.  Patients receiving narcotic/controlled substance medications need to be seen at least once every 3 months.  In the event that your preferred pharmacy does not have the requested medication in stock (e.g. Backordered), it is your responsibility to find another pharmacy that has the requested medication available.  We will gladly send a new prescription to that pharmacy at your request.    Scheduling Tests:    If your physician has ordered radiology tests such as MRI or CT scans, please contact Central Scheduling at [phone] right away to schedule the test.  Once scheduled, the [facility] Centralized Referral Team will work with your insurance carrier to obtain pre-certification or prior authorization.  Depending on your insurance carrier, approval may take 3-10 days.  It is highly recommended patients assure they have received an authorization before having a test performed.  If test is done without insurance authorization, patient may be responsible for the entire amount billed.      Precertification and Prior Authorizations:  If your physician has recommended that you have a procedure or additional testing performed the [facility]  Centralized Referral Team will contact your insurance carrier to obtain pre-certification or prior authorization.    You are strongly encouraged to contact your insurance carrier to verify that your procedure/test has been approved and is a COVERED benefit.  Although the [facility] Centralized Referral Team does its due diligence, the insurance carrier gives the disclaimer that \"Although the procedure is authorized, this does not guarantee payment.\"    Ultimately the patient is responsible for payment.   Thank you for your understanding in this matter.  Paperwork Completion:  If you require FMLA or disability paperwork for your recovery, please make sure to either drop it off or have it faxed to our office at [phone]. Be sure the form has your name and date of birth on it.  The form will be faxed to our Forms Department and they will complete it for you.  There is a 25$ fee for all forms that need to be filled out.  Please be aware there is a 10-14 day turnaround time.  You will need to sign a release of information (DENY) form if your paperwork does not come with one.  You may call the Forms Department with any questions at [phone].  Their fax number is [phone].

## 2025-07-29 ENCOUNTER — OFFICE VISIT (OUTPATIENT)
Dept: INTERNAL MEDICINE CLINIC | Facility: CLINIC | Age: 50
End: 2025-07-29
Payer: COMMERCIAL

## 2025-07-29 VITALS
BODY MASS INDEX: 30 KG/M2 | OXYGEN SATURATION: 99 % | WEIGHT: 178 LBS | DIASTOLIC BLOOD PRESSURE: 56 MMHG | HEART RATE: 98 BPM | SYSTOLIC BLOOD PRESSURE: 100 MMHG | TEMPERATURE: 98 F

## 2025-07-29 DIAGNOSIS — E55.9 VITAMIN D DEFICIENCY: ICD-10-CM

## 2025-07-29 DIAGNOSIS — K21.9 GASTROESOPHAGEAL REFLUX DISEASE, UNSPECIFIED WHETHER ESOPHAGITIS PRESENT: ICD-10-CM

## 2025-07-29 DIAGNOSIS — M54.16 CHRONIC LUMBAR RADICULOPATHY: ICD-10-CM

## 2025-07-29 DIAGNOSIS — I83.90 VARICOSE VEINS: ICD-10-CM

## 2025-07-29 DIAGNOSIS — E61.1 IRON DEFICIENCY: ICD-10-CM

## 2025-07-29 DIAGNOSIS — Z00.00 WELLNESS EXAMINATION: Primary | ICD-10-CM

## 2025-07-29 DIAGNOSIS — Z12.11 SCREEN FOR COLON CANCER: ICD-10-CM

## 2025-07-29 PROCEDURE — 99396 PREV VISIT EST AGE 40-64: CPT | Performed by: INTERNAL MEDICINE

## 2025-07-29 PROCEDURE — 3078F DIAST BP <80 MM HG: CPT | Performed by: INTERNAL MEDICINE

## 2025-07-29 PROCEDURE — 3074F SYST BP LT 130 MM HG: CPT | Performed by: INTERNAL MEDICINE

## 2025-08-13 ENCOUNTER — PATIENT MESSAGE (OUTPATIENT)
Dept: INTERNAL MEDICINE CLINIC | Facility: CLINIC | Age: 50
End: 2025-08-13

## 2025-08-27 ENCOUNTER — OFFICE VISIT (OUTPATIENT)
Facility: CLINIC | Age: 50
End: 2025-08-27

## 2025-08-27 VITALS
HEIGHT: 65 IN | DIASTOLIC BLOOD PRESSURE: 62 MMHG | BODY MASS INDEX: 31.55 KG/M2 | SYSTOLIC BLOOD PRESSURE: 106 MMHG | WEIGHT: 189.38 LBS

## 2025-08-27 DIAGNOSIS — M79.605 BILATERAL LEG PAIN: Primary | ICD-10-CM

## 2025-08-27 DIAGNOSIS — M79.604 BILATERAL LEG PAIN: Primary | ICD-10-CM

## 2025-08-27 PROCEDURE — 99203 OFFICE O/P NEW LOW 30 MIN: CPT

## 2025-08-27 PROCEDURE — 3078F DIAST BP <80 MM HG: CPT

## 2025-08-27 PROCEDURE — 3074F SYST BP LT 130 MM HG: CPT

## 2025-08-27 PROCEDURE — 3008F BODY MASS INDEX DOCD: CPT

## (undated) DEVICE — INSUFFLATION NEEDLE TO ESTABLISH PNEUMOPERITONEUM.: Brand: INSUFFLATION NEEDLE

## (undated) DEVICE — UNDYED BRAIDED (POLYGLACTIN 910), SYNTHETIC ABSORBABLE SUTURE: Brand: COATED VICRYL

## (undated) DEVICE — DISPOSABLE LAPAROSCOPIC CLIP APPLIER WITH 20 CLIPS.: Brand: EPIX® UNIVERSAL CLIP APPLIER

## (undated) DEVICE — L-HOOK CAUTERY PROBE TIP, DISPOSABLE: Brand: RENEW

## (undated) DEVICE — TRADITIONAL MARYLAND DISSECTOR TIP, DISPOSABLE: Brand: RENEW

## (undated) DEVICE — C-ARM: Brand: UNBRANDED

## (undated) DEVICE — Device

## (undated) DEVICE — MONOFILAMENT ABSORBABLE SUTURE: Brand: MAXON

## (undated) DEVICE — TROCAR: Brand: KII SHIELDED BLADED ACCESS SYSTEM

## (undated) DEVICE — SLEEVE KENDALL SCD EXPRESS MED

## (undated) DEVICE — ZIPWIRE GUIDE .038X150 STR/STF

## (undated) DEVICE — STERILE POLYISOPRENE POWDER-FREE SURGICAL GLOVES: Brand: PROTEXIS

## (undated) DEVICE — LAPCLINCH GRASPER TIP, DISPOSABLE: Brand: RENEW

## (undated) DEVICE — POUCH SPECIMEN WIRE 6X3 250ML

## (undated) DEVICE — LAP CHOLE/APPY CDS-LF: Brand: MEDLINE INDUSTRIES, INC.

## (undated) DEVICE — ENDOCUT SCISSOR TIP, DISPOSABLE: Brand: RENEW

## (undated) DEVICE — TIGERTAIL 5F FLXTIP 70CM

## (undated) DEVICE — GRABBER GRASPER TIP, DISPOSABLE: Brand: RENEW

## (undated) DEVICE — SOL NACL IRRIG 0.9% 1000ML BTL

## (undated) DEVICE — APPLICATOR CHLORAPREP 26ML

## (undated) DEVICE — TROCAR: Brand: KII® SLEEVE

## (undated) DEVICE — LIGHT HANDLE

## (undated) DEVICE — 40580 - THE PINK PAD - ADVANCED TRENDELENBURG POSITIONING KIT: Brand: 40580 - THE PINK PAD - ADVANCED TRENDELENBURG POSITIONING KIT